# Patient Record
Sex: MALE | Race: BLACK OR AFRICAN AMERICAN | NOT HISPANIC OR LATINO | URBAN - METROPOLITAN AREA
[De-identification: names, ages, dates, MRNs, and addresses within clinical notes are randomized per-mention and may not be internally consistent; named-entity substitution may affect disease eponyms.]

---

## 2023-10-14 ENCOUNTER — HOSPITAL ENCOUNTER (EMERGENCY)
Facility: HOSPITAL | Age: 33
Discharge: HOME | End: 2023-10-14
Payer: COMMERCIAL

## 2023-10-14 VITALS
BODY MASS INDEX: 50.62 KG/M2 | HEART RATE: 77 BPM | HEIGHT: 66 IN | WEIGHT: 315 LBS | RESPIRATION RATE: 16 BRPM | OXYGEN SATURATION: 97 % | TEMPERATURE: 98.4 F

## 2023-10-14 DIAGNOSIS — G43.009 MIGRAINE WITHOUT AURA AND WITHOUT STATUS MIGRAINOSUS, NOT INTRACTABLE: Primary | ICD-10-CM

## 2023-10-14 PROCEDURE — 99283 EMERGENCY DEPT VISIT LOW MDM: CPT

## 2023-10-14 PROCEDURE — 99284 EMERGENCY DEPT VISIT MOD MDM: CPT

## 2023-10-14 PROCEDURE — 2500000004 HC RX 250 GENERAL PHARMACY W/ HCPCS (ALT 636 FOR OP/ED)

## 2023-10-14 RX ORDER — KETOROLAC TROMETHAMINE 30 MG/ML
30 INJECTION, SOLUTION INTRAMUSCULAR; INTRAVENOUS ONCE
Status: COMPLETED | OUTPATIENT
Start: 2023-10-14 | End: 2023-10-14

## 2023-10-14 RX ORDER — METOCLOPRAMIDE HYDROCHLORIDE 5 MG/ML
10 INJECTION INTRAMUSCULAR; INTRAVENOUS ONCE
Status: COMPLETED | OUTPATIENT
Start: 2023-10-14 | End: 2023-10-14

## 2023-10-14 RX ORDER — DIPHENHYDRAMINE HYDROCHLORIDE 50 MG/ML
25 INJECTION INTRAMUSCULAR; INTRAVENOUS ONCE
Status: COMPLETED | OUTPATIENT
Start: 2023-10-14 | End: 2023-10-14

## 2023-10-14 RX ADMIN — DIPHENHYDRAMINE HYDROCHLORIDE 25 MG: 50 INJECTION INTRAMUSCULAR; INTRAVENOUS at 07:43

## 2023-10-14 RX ADMIN — METOCLOPRAMIDE 10 MG: 5 INJECTION, SOLUTION INTRAMUSCULAR; INTRAVENOUS at 07:43

## 2023-10-14 RX ADMIN — KETOROLAC TROMETHAMINE 30 MG: 30 INJECTION, SOLUTION INTRAMUSCULAR; INTRAVENOUS at 07:43

## 2023-10-14 ASSESSMENT — LIFESTYLE VARIABLES
HAVE PEOPLE ANNOYED YOU BY CRITICIZING YOUR DRINKING: NO
REASON UNABLE TO ASSESS: NO
HAVE YOU EVER FELT YOU SHOULD CUT DOWN ON YOUR DRINKING: NO
EVER HAD A DRINK FIRST THING IN THE MORNING TO STEADY YOUR NERVES TO GET RID OF A HANGOVER: NO
EVER FELT BAD OR GUILTY ABOUT YOUR DRINKING: NO

## 2023-10-14 ASSESSMENT — PAIN - FUNCTIONAL ASSESSMENT: PAIN_FUNCTIONAL_ASSESSMENT: 0-10

## 2023-10-14 ASSESSMENT — COLUMBIA-SUICIDE SEVERITY RATING SCALE - C-SSRS
6. HAVE YOU EVER DONE ANYTHING, STARTED TO DO ANYTHING, OR PREPARED TO DO ANYTHING TO END YOUR LIFE?: NO
2. HAVE YOU ACTUALLY HAD ANY THOUGHTS OF KILLING YOURSELF?: NO
1. IN THE PAST MONTH, HAVE YOU WISHED YOU WERE DEAD OR WISHED YOU COULD GO TO SLEEP AND NOT WAKE UP?: NO

## 2023-10-14 ASSESSMENT — PAIN DESCRIPTION - PAIN TYPE: TYPE: ACUTE PAIN

## 2023-10-14 ASSESSMENT — PAIN DESCRIPTION - PROGRESSION: CLINICAL_PROGRESSION: NOT CHANGED

## 2023-10-14 ASSESSMENT — PAIN SCALES - GENERAL: PAINLEVEL_OUTOF10: 10 - WORST POSSIBLE PAIN

## 2023-10-14 ASSESSMENT — PAIN DESCRIPTION - LOCATION: LOCATION: NECK

## 2023-10-14 NOTE — Clinical Note
Luis Mcgill was seen and treated in our emergency department on 10/14/2023.  He may return to work on 10/15/2023.       If you have any questions or concerns, please don't hesitate to call.      Rocío Bautista PA-C

## 2023-10-14 NOTE — ED TRIAGE NOTES
Pt reports to ED for Migraine upon wakening, Stiff Neck, Bilateral blurry vision associated to migraine. Pt states medications haven't been helpful towards pain. PMHx of Migraines but pt states this is a new pain. Pt states pain is at occipital region of head. Axox4. Pt gait steady.

## 2023-10-14 NOTE — ED PROVIDER NOTES
"HPI   Chief Complaint   Patient presents with    Neck Pain    Eye Problem    Headache       Patient is a 33-year-old male with past medical history of migraines that presents today for concerns of a migraine.  Notes that for the past few days, he has noted bilateral occipital migraines with vision blurriness.  Notes that this is typical to his migraines.  Some nausea, no vomiting.  Notes that he goes to bed without the migraines and wakes up with severe neck pain which is new to him.  Notes that after few hours of being awake, the neck pain alleviates though it comes back the next morning when he wakes up.  Notes that he has been having a lot of stress in his life.  Notes that he has a \"sense of doom\" and has intense new panic attacks with associated shortness of breath and throat tightness.  Attempting to take a new SSRI per his doctor for the past 3 weeks, though he does not take it daily.  no SI/HI.                          Og Coma Scale Score: 15                  Patient History   Past Medical History:   Diagnosis Date    Migraine     Priapism, unspecified 03/13/2015    Priapism     Past Surgical History:   Procedure Laterality Date    OTHER SURGICAL HISTORY  02/11/2020    No history of surgery     No family history on file.  Social History     Tobacco Use    Smoking status: Never    Smokeless tobacco: Never   Vaping Use    Vaping Use: Never used   Substance Use Topics    Alcohol use: Never    Drug use: Never       Physical Exam   ED Triage Vitals [10/14/23 0437]   Temp Heart Rate Resp BP   36.9 °C (98.4 °F) 77 16 --      SpO2 Temp Source Heart Rate Source Patient Position   97 % Temporal Monitor --      BP Location FiO2 (%)     -- --       Physical Exam  Vitals and nursing note reviewed.   Constitutional:       General: He is not in acute distress.     Appearance: Normal appearance. He is not ill-appearing.   HENT:      Head: Normocephalic and atraumatic.      Right Ear: External ear normal.      Left Ear: " External ear normal.      Nose: Nose normal. No congestion or rhinorrhea.      Mouth/Throat:      Pharynx: Oropharynx is clear. No oropharyngeal exudate or posterior oropharyngeal erythema.   Eyes:      Extraocular Movements: Extraocular movements intact.      Conjunctiva/sclera: Conjunctivae normal.      Pupils: Pupils are equal, round, and reactive to light.   Cardiovascular:      Rate and Rhythm: Normal rate and regular rhythm.      Heart sounds: No murmur heard.     No friction rub. No gallop.   Pulmonary:      Effort: Pulmonary effort is normal. No accessory muscle usage or respiratory distress.      Breath sounds: Normal breath sounds. No stridor. No wheezing, rhonchi or rales.   Abdominal:      General: Abdomen is flat. Bowel sounds are normal. There is no distension.      Palpations: Abdomen is soft. There is no mass.      Tenderness: There is no abdominal tenderness. There is no right CVA tenderness, left CVA tenderness, guarding or rebound.   Musculoskeletal:         General: No swelling or deformity. Normal range of motion.      Cervical back: Normal range of motion and neck supple. No rigidity. No pain with movement. Normal range of motion.      Right lower leg: No edema.      Left lower leg: No edema.   Lymphadenopathy:      Cervical: No cervical adenopathy.   Skin:     General: Skin is warm and dry.      Capillary Refill: Capillary refill takes less than 2 seconds.      Findings: No laceration, lesion or rash.   Neurological:      General: No focal deficit present.      Mental Status: He is alert and oriented to person, place, and time.      Cranial Nerves: Cranial nerves 2-12 are intact.      Sensory: No sensory deficit.      Motor: Motor function is intact. No weakness or pronator drift.      Coordination: Romberg sign negative. Finger-Nose-Finger Test and Heel to Shin Test normal.      Gait: Gait normal.      Deep Tendon Reflexes: Reflexes normal.   Psychiatric:         Mood and Affect: Mood and  affect normal.         Speech: Speech normal.         Behavior: Behavior normal. Behavior is cooperative.         Thought Content: Thought content normal.         Judgment: Judgment normal.         ED Course & MDM   Diagnoses as of 10/14/23 0835   Migraine without aura and without status migrainosus, not intractable       Medical Decision Making  Patient is a 33-year-old male who presents today for migraine.  Notes that he has had a headache for the past few days that is typical with his migraines.  Also does note that he has been having neck pain/tension when he wakes up in the morning for the past few days.  He notes that the neck pain is not consistent with his migraines.  He does note that he has been having a lot of stressors in his life and waking up with panic attacks.  Has been seeing his physician for this panic attack/anxiety.  Started taking Effexor, though not consistently.  No SI/HI.  Denies any fever or chills, no neck rigidity on exam, no pain with neck movement.  I have very low suspicion for meningitis.    Neuro exam shows no cranial nerve deficits.  Upper and lower extremities 5 out of 5 and equal strength.  No noted focal deficits or cerebellar dysfunction.  Does note some decreased vision with his migraines, though this is typical for him.  No concern for CVA at this time.  I believe that his neck pain is from tension headache due to increased stressors in his life.  We will give him a migraine cocktail including Benadryl, Reglan and Toradol.  I did offer to give him IV fluids for rehydration in case he is dehydrated.  He declined at this time stating he does not want an IV.  Encouraged oral hydration.  Upon reevaluation, patient notes that he feels some decrease in his headache, though it is present.  Of note, his only been approximately 10 to 15 minutes after receiving IM medications and I do not believe that they have hit full effect.  He notes that he does not want to stay and requesting to  leave since he has been here for approximately 3 to 4 hours.  Gave him strict return precautions, told him that he needs to consistently take his SSRIs, and told him to follow-up with his neurologist if the headaches are persistent.        Procedure  Procedures     Rocío Bautista PA-C  10/14/23 0885

## 2023-10-14 NOTE — ED NOTES
Discharge paperwork gone over with pt. Education provided and prescriptions (if applicable) were given to pt. Pt IV removed and there is no bleeding at the site of removal. Pt was ambulatory out of ED independently. Pt has no further questions or concerns at this time. Treatment complete.        Sanju Abraham RN  10/14/23 9208

## 2023-12-01 PROBLEM — E66.9 OBESITY: Status: ACTIVE | Noted: 2023-12-01

## 2023-12-01 PROBLEM — J06.9 VIRAL URI WITH COUGH: Status: ACTIVE | Noted: 2023-12-01

## 2023-12-01 PROBLEM — G89.29 CHRONIC PAIN OF BOTH KNEES: Status: ACTIVE | Noted: 2021-07-27

## 2023-12-01 PROBLEM — F41.0 SEVERE ANXIETY WITH PANIC: Status: ACTIVE | Noted: 2023-02-01

## 2023-12-01 PROBLEM — R00.2 PALPITATIONS: Status: ACTIVE | Noted: 2023-12-01

## 2023-12-01 PROBLEM — R05.9 COUGH: Status: ACTIVE | Noted: 2017-01-27

## 2023-12-01 PROBLEM — K21.9 GERD WITHOUT ESOPHAGITIS: Status: ACTIVE | Noted: 2021-12-22

## 2023-12-01 PROBLEM — K76.0 FATTY LIVER: Status: ACTIVE | Noted: 2021-07-27

## 2023-12-01 PROBLEM — K29.70 GASTRITIS: Status: ACTIVE | Noted: 2021-01-11

## 2023-12-01 PROBLEM — K22.89 ESOPHAGOGASTRIC JUNCTION OUTFLOW OBSTRUCTION: Status: ACTIVE | Noted: 2023-12-01

## 2023-12-01 PROBLEM — K22.4 ESOPHAGEAL SPASM: Status: ACTIVE | Noted: 2023-12-01

## 2023-12-01 PROBLEM — H01.00B BLEPHARITIS OF BOTH UPPER AND LOWER EYELID OF LEFT EYE: Status: ACTIVE | Noted: 2023-12-01

## 2023-12-01 PROBLEM — H10.33 ACUTE BACTERIAL CONJUNCTIVITIS OF BOTH EYES: Status: ACTIVE | Noted: 2023-12-01

## 2023-12-01 PROBLEM — E66.813 OBESITY, CLASS III, BMI 40-49.9 (MORBID OBESITY): Status: ACTIVE | Noted: 2021-12-08

## 2023-12-01 PROBLEM — H35.413 BILATERAL RETINAL LATTICE DEGENERATION: Status: ACTIVE | Noted: 2021-12-08

## 2023-12-01 PROBLEM — G89.29 CHRONIC MIDLINE LOW BACK PAIN WITHOUT SCIATICA: Status: ACTIVE | Noted: 2021-10-29

## 2023-12-01 PROBLEM — M54.50 CHRONIC MIDLINE LOW BACK PAIN WITHOUT SCIATICA: Status: ACTIVE | Noted: 2021-10-29

## 2023-12-01 PROBLEM — E55.9 VITAMIN D DEFICIENCY: Status: ACTIVE | Noted: 2021-03-22

## 2023-12-01 PROBLEM — M25.562 CHRONIC PAIN OF BOTH KNEES: Status: ACTIVE | Noted: 2021-07-27

## 2023-12-01 PROBLEM — R07.89 CHEST PAIN, ATYPICAL: Status: ACTIVE | Noted: 2023-12-01

## 2023-12-01 PROBLEM — R73.03 PREDIABETES: Status: ACTIVE | Noted: 2020-12-15

## 2023-12-01 PROBLEM — G43.909 MIGRAINE: Status: ACTIVE | Noted: 2023-12-01

## 2023-12-01 PROBLEM — M25.552 LEFT HIP PAIN: Status: ACTIVE | Noted: 2021-07-27

## 2023-12-01 PROBLEM — N48.89 PENILE PAIN: Status: ACTIVE | Noted: 2023-12-01

## 2023-12-01 PROBLEM — M19.90 ARTHRITIS: Status: ACTIVE | Noted: 2022-04-21

## 2023-12-01 PROBLEM — Z98.84 BARIATRIC SURGERY STATUS: Status: ACTIVE | Noted: 2023-12-01

## 2023-12-01 PROBLEM — K22.2 ESOPHAGEAL OBSTRUCTION: Status: ACTIVE | Noted: 2021-01-11

## 2023-12-01 PROBLEM — M79.7 FIBROMYALGIA: Status: ACTIVE | Noted: 2021-10-29

## 2023-12-01 PROBLEM — N48.30 PRIAPISM: Status: ACTIVE | Noted: 2020-04-24

## 2023-12-01 PROBLEM — E78.5 BORDERLINE HYPERLIPIDEMIA: Status: ACTIVE | Noted: 2020-12-15

## 2023-12-01 PROBLEM — B36.9 FUNGAL INFECTION OF SKIN: Status: ACTIVE | Noted: 2023-12-01

## 2023-12-01 PROBLEM — M16.0 PRIMARY OSTEOARTHRITIS OF BOTH HIPS: Status: ACTIVE | Noted: 2021-07-27

## 2023-12-01 PROBLEM — G47.33 OBSTRUCTIVE SLEEP APNEA SYNDROME: Status: ACTIVE | Noted: 2021-12-08

## 2023-12-01 PROBLEM — M25.561 CHRONIC PAIN OF BOTH KNEES: Status: ACTIVE | Noted: 2021-07-27

## 2023-12-01 PROBLEM — K22.2 ESOPHAGOGASTRIC JUNCTION OUTFLOW OBSTRUCTION: Status: ACTIVE | Noted: 2023-12-01

## 2023-12-01 PROBLEM — H01.00A BLEPHARITIS OF BOTH UPPER AND LOWER EYELID OF RIGHT EYE: Status: ACTIVE | Noted: 2023-12-01

## 2023-12-01 PROBLEM — F54 PSYCHOLOGICAL FACTOR AFFECTING PHYSICAL CONDITION: Status: ACTIVE | Noted: 2023-12-01

## 2023-12-01 PROBLEM — E66.01 OBESITY, CLASS III, BMI 40-49.9 (MORBID OBESITY) (MULTI): Status: ACTIVE | Noted: 2021-12-08

## 2023-12-01 PROBLEM — F32.9 CURRENT EPISODE OF MAJOR DEPRESSIVE DISORDER WITHOUT PRIOR EPISODE: Status: ACTIVE | Noted: 2021-12-22

## 2023-12-01 PROBLEM — F41.9 ANXIETY AND DEPRESSION: Status: ACTIVE | Noted: 2021-07-27

## 2023-12-01 PROBLEM — R07.81 PLEURITIC PAIN: Status: ACTIVE | Noted: 2017-01-27

## 2023-12-01 PROBLEM — F32.A ANXIETY AND DEPRESSION: Status: ACTIVE | Noted: 2021-07-27

## 2023-12-01 PROBLEM — L73.9 FOLLICULITIS: Status: ACTIVE | Noted: 2020-04-24

## 2023-12-01 PROBLEM — K31.89 ESOPHAGOGASTRIC JUNCTION OUTFLOW OBSTRUCTION: Status: ACTIVE | Noted: 2023-12-01

## 2023-12-01 RX ORDER — OMEPRAZOLE 20 MG/1
20 CAPSULE, DELAYED RELEASE ORAL 2 TIMES DAILY
COMMUNITY
Start: 2022-12-08 | End: 2023-12-05 | Stop reason: WASHOUT

## 2023-12-01 RX ORDER — PEDI MULTIVIT NO.25/FOLIC ACID 300 MCG
18 TABLET,CHEWABLE ORAL 2 TIMES DAILY
COMMUNITY
Start: 2020-07-30 | End: 2023-12-05 | Stop reason: WASHOUT

## 2023-12-01 RX ORDER — CYCLOBENZAPRINE HCL 10 MG
10 TABLET ORAL 3 TIMES DAILY
COMMUNITY
Start: 2022-05-27 | End: 2023-12-05 | Stop reason: WASHOUT

## 2023-12-01 RX ORDER — NITROGLYCERIN 0.4 MG/1
0.4 TABLET SUBLINGUAL
COMMUNITY
Start: 2022-09-25 | End: 2023-12-05 | Stop reason: WASHOUT

## 2023-12-01 RX ORDER — MULTIVIT-MIN/VIT C/HERB NO.124 250-8.875
TABLET,CHEWABLE ORAL
COMMUNITY
Start: 2020-07-30 | End: 2023-12-05 | Stop reason: WASHOUT

## 2023-12-01 RX ORDER — SODIUM CHLORIDE 0.9 % (FLUSH) 0.9 %
10 SYRINGE (ML) INJECTION
COMMUNITY
Start: 2022-09-25 | End: 2023-12-05 | Stop reason: WASHOUT

## 2023-12-01 RX ORDER — GABAPENTIN 600 MG/1
600 TABLET ORAL 3 TIMES DAILY
COMMUNITY
Start: 2022-03-14 | End: 2023-12-05 | Stop reason: WASHOUT

## 2023-12-01 RX ORDER — VENLAFAXINE HYDROCHLORIDE 37.5 MG/1
37.5 CAPSULE, EXTENDED RELEASE ORAL
COMMUNITY
Start: 2022-11-21 | End: 2023-12-05 | Stop reason: WASHOUT

## 2023-12-01 RX ORDER — HYDROXYZINE HYDROCHLORIDE 25 MG/1
25 TABLET, FILM COATED ORAL EVERY 6 HOURS PRN
COMMUNITY
Start: 2023-07-24 | End: 2024-04-18 | Stop reason: SDUPTHER

## 2023-12-01 RX ORDER — DOXYCYCLINE HYCLATE 100 MG
100 TABLET ORAL 2 TIMES DAILY
COMMUNITY
Start: 2021-04-16 | End: 2023-12-05 | Stop reason: WASHOUT

## 2023-12-01 RX ORDER — SUCRALFATE 1 G/1
1 TABLET ORAL 4 TIMES DAILY
COMMUNITY
Start: 2023-07-24 | End: 2023-12-05 | Stop reason: WASHOUT

## 2023-12-01 RX ORDER — VENLAFAXINE HYDROCHLORIDE 150 MG/1
150 CAPSULE, EXTENDED RELEASE ORAL
COMMUNITY
Start: 2023-09-27 | End: 2023-12-05 | Stop reason: WASHOUT

## 2023-12-01 RX ORDER — DULAGLUTIDE 0.75 MG/.5ML
0.75 INJECTION, SOLUTION SUBCUTANEOUS
COMMUNITY
Start: 2022-06-29 | End: 2023-12-05 | Stop reason: WASHOUT

## 2023-12-01 RX ORDER — SERTRALINE HYDROCHLORIDE 100 MG/1
50 TABLET, FILM COATED ORAL
COMMUNITY
Start: 2022-10-26 | End: 2023-12-05 | Stop reason: WASHOUT

## 2023-12-01 RX ORDER — ACETAMINOPHEN 500 MG
2000 TABLET ORAL DAILY
COMMUNITY
Start: 2021-03-22 | End: 2023-12-05 | Stop reason: WASHOUT

## 2023-12-01 RX ORDER — TADALAFIL 5 MG/1
1 TABLET ORAL DAILY
COMMUNITY
Start: 2020-12-21 | End: 2023-12-05 | Stop reason: WASHOUT

## 2023-12-01 RX ORDER — IBUPROFEN 600 MG/1
600 TABLET ORAL 4 TIMES DAILY PRN
COMMUNITY
Start: 2022-05-23 | End: 2023-12-05 | Stop reason: WASHOUT

## 2023-12-01 RX ORDER — PREGABALIN 50 MG/1
50 CAPSULE ORAL
COMMUNITY
Start: 2021-07-26 | End: 2023-12-05 | Stop reason: WASHOUT

## 2023-12-01 RX ORDER — MOXIFLOXACIN 5 MG/ML
1 SOLUTION/ DROPS OPHTHALMIC 4 TIMES DAILY
COMMUNITY
Start: 2020-09-14 | End: 2023-12-05 | Stop reason: WASHOUT

## 2023-12-01 RX ORDER — ERGOCALCIFEROL 1.25 MG/1
50000 CAPSULE ORAL
COMMUNITY
Start: 2022-07-27 | End: 2023-12-05 | Stop reason: WASHOUT

## 2023-12-05 ENCOUNTER — OFFICE VISIT (OUTPATIENT)
Dept: PRIMARY CARE | Facility: HOSPITAL | Age: 33
End: 2023-12-05
Payer: COMMERCIAL

## 2023-12-05 VITALS
OXYGEN SATURATION: 98 % | WEIGHT: 315 LBS | TEMPERATURE: 97.3 F | HEART RATE: 96 BPM | BODY MASS INDEX: 50.62 KG/M2 | SYSTOLIC BLOOD PRESSURE: 121 MMHG | HEIGHT: 66 IN | DIASTOLIC BLOOD PRESSURE: 73 MMHG

## 2023-12-05 DIAGNOSIS — E66.8 EXTREME OBESITY: ICD-10-CM

## 2023-12-05 DIAGNOSIS — K21.00 GASTROESOPHAGEAL REFLUX DISEASE WITH ESOPHAGITIS WITHOUT HEMORRHAGE: ICD-10-CM

## 2023-12-05 DIAGNOSIS — F32.A DEPRESSION, UNSPECIFIED DEPRESSION TYPE: ICD-10-CM

## 2023-12-05 DIAGNOSIS — E88.810 METABOLIC SYNDROME: ICD-10-CM

## 2023-12-05 DIAGNOSIS — R07.89 OTHER CHEST PAIN: Primary | ICD-10-CM

## 2023-12-05 DIAGNOSIS — G43.809 OTHER MIGRAINE WITHOUT STATUS MIGRAINOSUS, NOT INTRACTABLE: ICD-10-CM

## 2023-12-05 DIAGNOSIS — Z11.3 ROUTINE SCREENING FOR STI (SEXUALLY TRANSMITTED INFECTION): ICD-10-CM

## 2023-12-05 DIAGNOSIS — J02.9 SORE THROAT: ICD-10-CM

## 2023-12-05 LAB
EST. AVERAGE GLUCOSE BLD GHB EST-MCNC: 134 MG/DL
GLUCOSE BLD MANUAL STRIP-MCNC: 121 MG/DL (ref 74–99)
HBA1C MFR BLD: 6.3 %
HCV AB SER QL: NONREACTIVE
HIV 1+2 AB+HIV1 P24 AG SERPL QL IA: NONREACTIVE
POC RAPID STREP: NEGATIVE
T PALLIDUM AB SER QL: NONREACTIVE

## 2023-12-05 PROCEDURE — 1036F TOBACCO NON-USER: CPT | Performed by: STUDENT IN AN ORGANIZED HEALTH CARE EDUCATION/TRAINING PROGRAM

## 2023-12-05 PROCEDURE — 99205 OFFICE O/P NEW HI 60 MIN: CPT | Performed by: STUDENT IN AN ORGANIZED HEALTH CARE EDUCATION/TRAINING PROGRAM

## 2023-12-05 PROCEDURE — 87661 TRICHOMONAS VAGINALIS AMPLIF: CPT | Performed by: STUDENT IN AN ORGANIZED HEALTH CARE EDUCATION/TRAINING PROGRAM

## 2023-12-05 PROCEDURE — 36415 COLL VENOUS BLD VENIPUNCTURE: CPT | Performed by: STUDENT IN AN ORGANIZED HEALTH CARE EDUCATION/TRAINING PROGRAM

## 2023-12-05 PROCEDURE — 87800 DETECT AGNT MULT DNA DIREC: CPT | Performed by: STUDENT IN AN ORGANIZED HEALTH CARE EDUCATION/TRAINING PROGRAM

## 2023-12-05 PROCEDURE — 86803 HEPATITIS C AB TEST: CPT | Performed by: STUDENT IN AN ORGANIZED HEALTH CARE EDUCATION/TRAINING PROGRAM

## 2023-12-05 PROCEDURE — 86780 TREPONEMA PALLIDUM: CPT | Performed by: STUDENT IN AN ORGANIZED HEALTH CARE EDUCATION/TRAINING PROGRAM

## 2023-12-05 PROCEDURE — 90471 IMMUNIZATION ADMIN: CPT | Mod: GC | Performed by: STUDENT IN AN ORGANIZED HEALTH CARE EDUCATION/TRAINING PROGRAM

## 2023-12-05 PROCEDURE — 3008F BODY MASS INDEX DOCD: CPT | Performed by: STUDENT IN AN ORGANIZED HEALTH CARE EDUCATION/TRAINING PROGRAM

## 2023-12-05 PROCEDURE — 93005 ELECTROCARDIOGRAM TRACING: CPT | Performed by: STUDENT IN AN ORGANIZED HEALTH CARE EDUCATION/TRAINING PROGRAM

## 2023-12-05 PROCEDURE — 83036 HEMOGLOBIN GLYCOSYLATED A1C: CPT | Performed by: STUDENT IN AN ORGANIZED HEALTH CARE EDUCATION/TRAINING PROGRAM

## 2023-12-05 PROCEDURE — 99215 OFFICE O/P EST HI 40 MIN: CPT | Mod: 25,GC | Performed by: STUDENT IN AN ORGANIZED HEALTH CARE EDUCATION/TRAINING PROGRAM

## 2023-12-05 PROCEDURE — 87389 HIV-1 AG W/HIV-1&-2 AB AG IA: CPT | Performed by: STUDENT IN AN ORGANIZED HEALTH CARE EDUCATION/TRAINING PROGRAM

## 2023-12-05 PROCEDURE — 82947 ASSAY GLUCOSE BLOOD QUANT: CPT | Performed by: STUDENT IN AN ORGANIZED HEALTH CARE EDUCATION/TRAINING PROGRAM

## 2023-12-05 PROCEDURE — 87880 STREP A ASSAY W/OPTIC: CPT | Mod: GC | Performed by: STUDENT IN AN ORGANIZED HEALTH CARE EDUCATION/TRAINING PROGRAM

## 2023-12-05 PROCEDURE — 36416 COLLJ CAPILLARY BLOOD SPEC: CPT | Mod: GC | Performed by: STUDENT IN AN ORGANIZED HEALTH CARE EDUCATION/TRAINING PROGRAM

## 2023-12-05 PROCEDURE — 93010 ELECTROCARDIOGRAM REPORT: CPT | Performed by: INTERNAL MEDICINE

## 2023-12-05 RX ORDER — SUMATRIPTAN 50 MG/1
50 TABLET, FILM COATED ORAL ONCE AS NEEDED
Qty: 27 TABLET | Refills: 3 | Status: SHIPPED | OUTPATIENT
Start: 2023-12-05 | End: 2024-04-18 | Stop reason: SDUPTHER

## 2023-12-05 RX ORDER — FAMOTIDINE 10 MG/1
10 TABLET ORAL 2 TIMES DAILY
Qty: 60 TABLET | Refills: 5 | Status: SHIPPED | OUTPATIENT
Start: 2023-12-05 | End: 2024-04-18 | Stop reason: WASHOUT

## 2023-12-05 RX ORDER — VENLAFAXINE HYDROCHLORIDE 75 MG/1
75 CAPSULE, EXTENDED RELEASE ORAL DAILY
Qty: 30 CAPSULE | Refills: 1 | Status: SHIPPED | OUTPATIENT
Start: 2023-12-05 | End: 2024-04-09

## 2023-12-05 ASSESSMENT — ENCOUNTER SYMPTOMS
LOSS OF SENSATION IN FEET: 0
DEPRESSION: 0
OCCASIONAL FEELINGS OF UNSTEADINESS: 0

## 2023-12-05 ASSESSMENT — PAIN SCALES - GENERAL: PAINLEVEL: 10-WORST PAIN EVER

## 2023-12-05 ASSESSMENT — COLUMBIA-SUICIDE SEVERITY RATING SCALE - C-SSRS
1. IN THE PAST MONTH, HAVE YOU WISHED YOU WERE DEAD OR WISHED YOU COULD GO TO SLEEP AND NOT WAKE UP?: NO
2. HAVE YOU ACTUALLY HAD ANY THOUGHTS OF KILLING YOURSELF?: NO
6. HAVE YOU EVER DONE ANYTHING, STARTED TO DO ANYTHING, OR PREPARED TO DO ANYTHING TO END YOUR LIFE?: NO
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
## 2023-12-05 NOTE — PATIENT INSTRUCTIONS
Eusebio Mr. Mcgill,    It was a pleasure meeting you! Today we talked about several important aspects of your health.     1) Weight loss will be extremely important for you, I am sending to see our bariatrics team to discuss weight loss options. I believe that this will be important for your overall health.     2) We have collected blood work today, I will call you with the results when they come back.     3) I have sent a referral for psychiatry and psychology to help us treat your depression. Please start taking venlafaxine 75 mg daily every day for the next month. I would like to see you back in clinic approximately 6 weeks to discuss how this is going.    4) For your GERD, I am prescribing famotidine.     5) For your migraines, please start imitrex which you can take when your symptoms come on. It is also ok to take acetaminophen and/or ibuprofen.     6) If you have worsening chest pain or shortness of breath, please go to the emergency room as this could be signs of a heart attack. The EKG we performed today was normal. If this worsens at all, please seek care at the ED.    7) We performed a rapid strep test here in the office. If it comes back positive, I will send antibiotics to your pharmacy. If not, please drink plenty of fluids at home and it should resolve on its own. If it is does not go away on its own in the next 10 days, please call our office.     Please return to clinic 6-8 weeks.    All the best,  Dr. López

## 2023-12-06 ENCOUNTER — DOCUMENTATION (OUTPATIENT)
Dept: SURGERY | Facility: HOSPITAL | Age: 33
End: 2023-12-06
Payer: COMMERCIAL

## 2023-12-06 LAB
C TRACH RRNA SPEC QL NAA+PROBE: NEGATIVE
N GONORRHOEA DNA SPEC QL PROBE+SIG AMP: NEGATIVE
T VAGINALIS RRNA SPEC QL NAA+PROBE: NEGATIVE

## 2023-12-06 NOTE — PROGRESS NOTES
Recvd Pt as referral. Scheduled. Pt chose [facility] at [facility], email sent of appt confirmation and np packet.

## 2023-12-07 ENCOUNTER — TELEPHONE (OUTPATIENT)
Dept: PRIMARY CARE | Facility: HOSPITAL | Age: 33
End: 2023-12-07
Payer: COMMERCIAL

## 2023-12-07 NOTE — TELEPHONE ENCOUNTER
Called patient to discuss all of his testing results. Informed him that all STI testing was negative and we discussed that while he does not have diabetes yet, his Hgb A1c is elevated and he has pre-diabetes. Discussed that we may want to start additional medications when he comes to clinic (metformin) and he inquired about restarting Trulicity which he took approximately 1 year ago. Informed him that he should discuss this with his bariatric provider and me when he comes to his next appointment in 2 months.

## 2023-12-08 ENCOUNTER — HOSPITAL ENCOUNTER (EMERGENCY)
Facility: HOSPITAL | Age: 33
Discharge: HOME | End: 2023-12-08
Attending: EMERGENCY MEDICINE
Payer: COMMERCIAL

## 2023-12-08 VITALS
BODY MASS INDEX: 50.62 KG/M2 | HEART RATE: 87 BPM | RESPIRATION RATE: 18 BRPM | WEIGHT: 315 LBS | HEIGHT: 66 IN | TEMPERATURE: 97.7 F | OXYGEN SATURATION: 96 % | SYSTOLIC BLOOD PRESSURE: 127 MMHG | DIASTOLIC BLOOD PRESSURE: 73 MMHG

## 2023-12-08 DIAGNOSIS — M54.2 NECK PAIN: Primary | ICD-10-CM

## 2023-12-08 PROCEDURE — 99284 EMERGENCY DEPT VISIT MOD MDM: CPT | Performed by: EMERGENCY MEDICINE

## 2023-12-08 PROCEDURE — 96372 THER/PROPH/DIAG INJ SC/IM: CPT

## 2023-12-08 PROCEDURE — 93010 ELECTROCARDIOGRAM REPORT: CPT

## 2023-12-08 PROCEDURE — 2500000004 HC RX 250 GENERAL PHARMACY W/ HCPCS (ALT 636 FOR OP/ED): Mod: SE

## 2023-12-08 PROCEDURE — 99284 EMERGENCY DEPT VISIT MOD MDM: CPT

## 2023-12-08 PROCEDURE — 99283 EMERGENCY DEPT VISIT LOW MDM: CPT | Mod: 25

## 2023-12-08 RX ORDER — KETOROLAC TROMETHAMINE 30 MG/ML
30 INJECTION, SOLUTION INTRAMUSCULAR; INTRAVENOUS ONCE
Status: COMPLETED | OUTPATIENT
Start: 2023-12-08 | End: 2023-12-08

## 2023-12-08 RX ORDER — ORPHENADRINE CITRATE 30 MG/ML
60 INJECTION INTRAMUSCULAR; INTRAVENOUS ONCE
Status: COMPLETED | OUTPATIENT
Start: 2023-12-08 | End: 2023-12-08

## 2023-12-08 RX ORDER — IBUPROFEN 600 MG/1
600 TABLET ORAL EVERY 8 HOURS PRN
Qty: 30 TABLET | Refills: 0 | Status: SHIPPED | OUTPATIENT
Start: 2023-12-08 | End: 2024-04-18 | Stop reason: SDUPTHER

## 2023-12-08 RX ORDER — ACETAMINOPHEN 325 MG/1
650 TABLET ORAL EVERY 6 HOURS PRN
Qty: 30 TABLET | Refills: 0 | Status: SHIPPED | OUTPATIENT
Start: 2023-12-08 | End: 2024-04-18 | Stop reason: SDUPTHER

## 2023-12-08 RX ADMIN — ORPHENADRINE CITRATE 60 MG: 60 INJECTION INTRAMUSCULAR; INTRAVENOUS at 16:06

## 2023-12-08 RX ADMIN — KETOROLAC TROMETHAMINE 30 MG: 30 INJECTION, SOLUTION INTRAMUSCULAR; INTRAVENOUS at 16:06

## 2023-12-08 NOTE — ED PROVIDER NOTES
HPI   Chief Complaint   Patient presents with    Sore Throat       Limitations to History: None    HPI: This is a 33-year-old male with a history of anxiety, obesity, GERD, depression, fibromyalgia, migraine headaches, at bedtime, and metabolic syndrome who presents to the emergency room with left-sided neck pain.  He states his symptoms began 2 days ago after experiencing an anxiety attack.  Patient denies any traumas or falls.  Patient states that his pain worsens when rotating his head to the right.  Patient denies any constitutional symptoms such as fevers, chills, cough, or nasal congestion.  Patient denies any difficulty swallowing or breathing.  Patient states that he has baseline chest pain and has no changes from his chronic chest pain.    Additional History Obtained from: None    12 point review of systems was performed and is negative unless otherwise specified    ------------------------------------------------------------------------------------------------------------------------------------------  Physical Exam:    VS: As documented in the triage note and EMR flowsheet from this visit were reviewed.    CONSTITUTIONAL: Well appearing, well nourished, awake, alert, oriented to person, place, time/situation and in no apparent distress.   EYES: Clear bilaterally, pupils equal, round and reactive to light.  ENT: Oropharynx is visualized to be nonerythematous.  There is no tonsillar exudates or swelling.  No tenderness to palpation over the trachea.  No trismus or uvula deviation.  Patient does have focal tenderness along the left sternocleidomastoid muscle.  CARDIOVASCULAR: Normal rate, regular rhythm.  Heart sounds S1, S2.  No murmurs, rubs or gallops.  RESPIRATORY: Breath sounds clear and equal bilaterally. No wheezes or rhonchi.   GASTROINTESTINAL: Abdomen soft, non-distended, no rebound, no guarding.   MUSCULOSKELETAL: Spine appears normal, range of motion is not limited, no muscle or joint tenderness.   Full extension, flexion, and rotation of the neck.  NEUROLOGICAL: Alert and oriented, no focal deficits, no motor or sensory deficits.   SKIN: Skin normal color for race, warm, dry and intact. No evidence of trauma.   PSYCHIATRIC: Alert and oriented to person, place, time/situation. normal mood and affect. No apparent risk to self or others.     ------------------------------------------------------------------------------------------------------------------------------------------    Medical Decision Making:    This is a 33-year-old male who presents to the emergency room with atraumatic left neck pain.  Patient main stable throughout course of care.  Given lack of trauma, I have lower concern for carotid dissection.  Patient has no signs of infection and I have low concern for strep or URI.  Patient likely has musculoskeletal pain.  This patient was staffed with my supervising attending who agreed with my evaluation.  Patient was administered Norflex and Toradol for symptomatic management.  Patient was reassessed and did have some improvement of symptoms.  Patient will be discharged home with prescriptions for ibuprofen and Tylenol for symptomatic management.  Patient was given strict return precautions.  Patient was agreeable to this plan and had no further questions.  I did provide patient with a number for the [facility] for outpatient follow-up.  Patient will be discharged home.  Patient understands that if symptoms worsen or change in any way, they should return for immediate medical attention.  Patient understands that they should follow-up with their primary care physician within the next week to follow-up for neck pain.  Patient was stable upon discharge.  EKG was unremarkable with normal sinus rhythm at 85 bpm.  P waves are associated with QRS complexes.  The axis is upright.  There are no ST segment or T wave abnormalities.  No significant change from previous EKG on 6/6/2023.      External  Records Reviewed: I reviewed recent and relevant outside records including: Previous provider notes      Escalation of Care:  Appropriate for outpatient follow-up with primary care provider    Social Determinants Affecting Care:  None    Prescription Drug Consideration: Ibuprofen, Tylenol      Discussion of Management with Other Providers:   I discussed the patient/results with: Supervising attending      NITHIN Washington PA-C  [facility]                            No data recorded                Patient History   Past Medical History:   Diagnosis Date    Migraine     Priapism, unspecified 03/13/2015    Priapism     Past Surgical History:   Procedure Laterality Date    OTHER SURGICAL HISTORY  02/11/2020    No history of surgery     No family history on file.  Social History     Tobacco Use    Smoking status: Never    Smokeless tobacco: Never   Vaping Use    Vaping Use: Never used   Substance Use Topics    Alcohol use: Never    Drug use: Never       Physical Exam   ED Triage Vitals [12/08/23 1447]   Temp Heart Rate Resp BP   36.5 °C (97.7 °F) 87 18 127/73      SpO2 Temp src Heart Rate Source Patient Position   96 % -- -- --      BP Location FiO2 (%)     -- --       Physical Exam    ED Course & MDM        Medical Decision Making      Procedure  Procedures     Simeon Burdick PA-C  12/08/23 1606       Simeon Burdick PA-C  12/08/23 1606

## 2023-12-08 NOTE — ED TRIAGE NOTES
2 day hx of neck pain starting in the anterior portion and left side. Pt states he has tried cough drops, tea without relief. Pain increased when moving. Endorses sore throat. States he had an anxiety attack several days ago and ha CP since.

## 2023-12-10 ENCOUNTER — ANCILLARY PROCEDURE (OUTPATIENT)
Dept: EMERGENCY MEDICINE | Facility: HOSPITAL | Age: 33
End: 2023-12-10
Payer: COMMERCIAL

## 2023-12-10 LAB
ATRIAL RATE: 85 BPM
P AXIS: 70 DEGREES
P OFFSET: 193 MS
P ONSET: 130 MS
PR INTERVAL: 192 MS
Q ONSET: 226 MS
QRS COUNT: 14 BEATS
QRS DURATION: 84 MS
QT INTERVAL: 376 MS
QTC CALCULATION(BAZETT): 447 MS
QTC FREDERICIA: 422 MS
R AXIS: 71 DEGREES
T AXIS: 68 DEGREES
T OFFSET: 414 MS
VENTRICULAR RATE: 85 BPM

## 2023-12-10 PROCEDURE — 93005 ELECTROCARDIOGRAM TRACING: CPT

## 2023-12-12 NOTE — PROGRESS NOTES
I saw and evaluated the patient. I personally obtained the key and critical portions of the history and physical exam or was physically present for key and critical portions performed by the resident/fellow. I reviewed the resident/fellow's documentation and discussed the patient with the resident/fellow. I agree with the resident/fellow's medical decision making as documented in the note.    Case d/w Dr. López; agree with his A/P. I have personally reviewed the plan with the pt as well. Lengthy discussion with pt re importance of weight loss and treating depression. Strep throat negative today.   Would check basic blood work, including A1C.  Would screen pt for NICOLE.    Hyun Guaman MD

## 2023-12-14 ENCOUNTER — ANCILLARY PROCEDURE (OUTPATIENT)
Dept: EMERGENCY MEDICINE | Facility: HOSPITAL | Age: 33
End: 2023-12-14
Payer: COMMERCIAL

## 2023-12-14 ENCOUNTER — APPOINTMENT (OUTPATIENT)
Dept: RADIOLOGY | Facility: HOSPITAL | Age: 33
End: 2023-12-14
Payer: COMMERCIAL

## 2023-12-14 ENCOUNTER — HOSPITAL ENCOUNTER (EMERGENCY)
Facility: HOSPITAL | Age: 33
Discharge: HOME | End: 2023-12-14
Attending: EMERGENCY MEDICINE
Payer: COMMERCIAL

## 2023-12-14 VITALS
OXYGEN SATURATION: 100 % | SYSTOLIC BLOOD PRESSURE: 116 MMHG | WEIGHT: 315 LBS | TEMPERATURE: 97.8 F | BODY MASS INDEX: 50.62 KG/M2 | HEART RATE: 71 BPM | RESPIRATION RATE: 18 BRPM | HEIGHT: 66 IN | DIASTOLIC BLOOD PRESSURE: 64 MMHG

## 2023-12-14 DIAGNOSIS — M54.41 ACUTE MIDLINE LOW BACK PAIN WITH RIGHT-SIDED SCIATICA: Primary | ICD-10-CM

## 2023-12-14 LAB
ATRIAL RATE: 66 BPM
P AXIS: 30 DEGREES
P OFFSET: 185 MS
P ONSET: 131 MS
PR INTERVAL: 194 MS
Q ONSET: 228 MS
QRS COUNT: 11 BEATS
QRS DURATION: 78 MS
QT INTERVAL: 408 MS
QTC CALCULATION(BAZETT): 427 MS
QTC FREDERICIA: 421 MS
R AXIS: 53 DEGREES
T AXIS: 66 DEGREES
T OFFSET: 432 MS
VENTRICULAR RATE: 66 BPM

## 2023-12-14 PROCEDURE — 93010 ELECTROCARDIOGRAM REPORT: CPT | Performed by: EMERGENCY MEDICINE

## 2023-12-14 PROCEDURE — 2500000005 HC RX 250 GENERAL PHARMACY W/O HCPCS: Mod: SE

## 2023-12-14 PROCEDURE — 93005 ELECTROCARDIOGRAM TRACING: CPT

## 2023-12-14 PROCEDURE — 99284 EMERGENCY DEPT VISIT MOD MDM: CPT | Performed by: EMERGENCY MEDICINE

## 2023-12-14 PROCEDURE — 72131 CT LUMBAR SPINE W/O DYE: CPT | Mod: FOREIGN READ | Performed by: RADIOLOGY

## 2023-12-14 PROCEDURE — 72131 CT LUMBAR SPINE W/O DYE: CPT

## 2023-12-14 PROCEDURE — 2500000004 HC RX 250 GENERAL PHARMACY W/ HCPCS (ALT 636 FOR OP/ED): Mod: SE

## 2023-12-14 PROCEDURE — 96372 THER/PROPH/DIAG INJ SC/IM: CPT

## 2023-12-14 RX ORDER — LIDOCAINE 560 MG/1
1 PATCH PERCUTANEOUS; TOPICAL; TRANSDERMAL ONCE
Status: DISCONTINUED | OUTPATIENT
Start: 2023-12-14 | End: 2023-12-14 | Stop reason: HOSPADM

## 2023-12-14 RX ORDER — ORPHENADRINE CITRATE 30 MG/ML
60 INJECTION INTRAMUSCULAR; INTRAVENOUS ONCE
Status: COMPLETED | OUTPATIENT
Start: 2023-12-14 | End: 2023-12-14

## 2023-12-14 RX ORDER — KETOROLAC TROMETHAMINE 30 MG/ML
60 INJECTION, SOLUTION INTRAMUSCULAR; INTRAVENOUS ONCE
Status: COMPLETED | OUTPATIENT
Start: 2023-12-14 | End: 2023-12-14

## 2023-12-14 RX ORDER — DEXAMETHASONE SODIUM PHOSPHATE 100 MG/10ML
10 INJECTION INTRAMUSCULAR; INTRAVENOUS ONCE
Status: DISCONTINUED | OUTPATIENT
Start: 2023-12-14 | End: 2023-12-14

## 2023-12-14 RX ORDER — METHOCARBAMOL 100 MG/ML
1000 INJECTION, SOLUTION INTRAMUSCULAR; INTRAVENOUS ONCE
Status: DISCONTINUED | OUTPATIENT
Start: 2023-12-14 | End: 2023-12-14

## 2023-12-14 RX ORDER — KETOROLAC TROMETHAMINE 10 MG/1
20 TABLET, FILM COATED ORAL EVERY 6 HOURS PRN
Qty: 20 TABLET | Refills: 0 | Status: SHIPPED | OUTPATIENT
Start: 2023-12-14 | End: 2023-12-19

## 2023-12-14 RX ORDER — METHOCARBAMOL 500 MG/1
500 TABLET, FILM COATED ORAL 2 TIMES DAILY
Qty: 20 TABLET | Refills: 0 | Status: SHIPPED | OUTPATIENT
Start: 2023-12-14 | End: 2024-04-18 | Stop reason: SDUPTHER

## 2023-12-14 RX ADMIN — LIDOCAINE 1 PATCH: 4 PATCH TOPICAL at 06:08

## 2023-12-14 RX ADMIN — DEXAMETHASONE 10 MG: 6 TABLET ORAL at 08:13

## 2023-12-14 RX ADMIN — ORPHENADRINE CITRATE 60 MG: 60 INJECTION INTRAMUSCULAR; INTRAVENOUS at 06:08

## 2023-12-14 RX ADMIN — KETOROLAC TROMETHAMINE 60 MG: 30 INJECTION, SOLUTION INTRAMUSCULAR; INTRAVENOUS at 06:08

## 2023-12-14 ASSESSMENT — LIFESTYLE VARIABLES
REASON UNABLE TO ASSESS: NO
EVER HAD A DRINK FIRST THING IN THE MORNING TO STEADY YOUR NERVES TO GET RID OF A HANGOVER: NO
HAVE YOU EVER FELT YOU SHOULD CUT DOWN ON YOUR DRINKING: NO
HAVE PEOPLE ANNOYED YOU BY CRITICIZING YOUR DRINKING: NO
EVER FELT BAD OR GUILTY ABOUT YOUR DRINKING: NO

## 2023-12-14 ASSESSMENT — COLUMBIA-SUICIDE SEVERITY RATING SCALE - C-SSRS
2. HAVE YOU ACTUALLY HAD ANY THOUGHTS OF KILLING YOURSELF?: NO
6. HAVE YOU EVER DONE ANYTHING, STARTED TO DO ANYTHING, OR PREPARED TO DO ANYTHING TO END YOUR LIFE?: NO
1. IN THE PAST MONTH, HAVE YOU WISHED YOU WERE DEAD OR WISHED YOU COULD GO TO SLEEP AND NOT WAKE UP?: NO

## 2023-12-14 ASSESSMENT — PAIN DESCRIPTION - LOCATION: LOCATION: BACK

## 2023-12-14 NOTE — ED TRIAGE NOTES
PT presents to ED via triage for chief complaint of left neck pain,  right lower back pain. PT also endorsing SOB. PT states he was recently seen for his neck pain and discharged. PT has a history of HTN, fibromyalgia, sciatica, GERD, depression and anxiety.

## 2023-12-14 NOTE — ED PROVIDER NOTES
CC: Back Pain, Neck Pain, and Shortness of Breath     HPI: Patient is a 33-year-old male with history of obesity, anxiety, GERD, depression, fibromyalgia, migraine headaches, and chronic sciatica presenting to the emergency department today for right lower back and left neck pain.  Patient reports for the past 2 weeks now has had left-sided neck tenderness that feels like his fibromyalgia pain but worse.  Reports it is worse with neck movements.  Has tried Tylenol/ibuprofen at home without improvement of the symptoms.  Was seen for an identical complaint a week ago and was treated with Norflex and Toradol with improvement of his symptoms.  Also reports the past 48 hours has had right-sided flank and lower lumbar spine pain that he reports is 10 out of 10 today.  Reports he has chronic sciatica of the right lower extremity but it is a little worse today.  Denies fevers, chills, chest pain, shortness of breath, abdominal pain, or change in urination/stool.  No saddle anesthesia or urinary incontinence noted.      Records Reviewed:  Recent available ED and inpatient notes reviewed in EMR.    PMHx/PSHx:  Per HPI.   - has a past medical history of Migraine and Priapism, unspecified.  - has a past surgical history that includes Other surgical history (02/11/2020).  - has Acute bacterial conjunctivitis of both eyes; Anxiety and depression; Depression; Bariatric surgery status; Bilateral retinal lattice degeneration; Blepharitis of both upper and lower eyelid of left eye; Blepharitis of both upper and lower eyelid of right eye; Borderline hyperlipidemia; Cervicalgia; Chronic midline low back pain without sciatica; Fibromyalgia; Chest pain, atypical; Non-cardiac chest pain; Pleuritic pain; Cough; Current episode of major depressive disorder without prior episode; Arthritis; Esophageal obstruction; Esophageal spasm; Esophagogastric junction outflow obstruction; Gastritis; Fatty liver; Folliculitis; Fungal infection of skin;  GERD without esophagitis; Hidradenitis suppurativa; Hypertrophy of breast; Chronic pain of both knees; Left hip pain; Migraine; Morbid obesity (CMS/[facility]); Obesity, Class III, BMI 40-49.9 (morbid obesity) (CMS/[facility]); Obesity; Obstructive sleep apnea syndrome; Palpitations; Penile pain; Prediabetes; Priapism; Primary osteoarthritis of both hips; Psychological factor affecting physical condition; Severe anxiety with panic; Viral URI with cough; and Vitamin D deficiency on their problem list.    Medications:  Reviewed in EMR. See EMR for complete list of medications and doses.    Allergies:  Amitriptyline, Amoxicillin, Citalopram, and Pregabalin    Social History:  - Tobacco:  reports that he has never smoked. He has never used smokeless tobacco.   - Alcohol:  reports no history of alcohol use.   - Illicit Drugs:  reports no history of drug use.     ROS:  Per HPI.       ???????????????????????????????????????????????????????????????  Triage Vitals:  T 36.6 °C (97.8 °F)  HR 71  /64  RR 18  O2 100 % None (Room air)    Physical Exam  Vitals and nursing note reviewed.   Constitutional:       General: He is not in acute distress.     Appearance: He is well-developed. He is obese.   HENT:      Head: Normocephalic and atraumatic.   Eyes:      Conjunctiva/sclera: Conjunctivae normal.   Cardiovascular:      Rate and Rhythm: Normal rate and regular rhythm.      Heart sounds: No murmur heard.  Pulmonary:      Effort: Pulmonary effort is normal. No respiratory distress.      Breath sounds: Normal breath sounds.   Abdominal:      Palpations: Abdomen is soft.      Tenderness: There is no abdominal tenderness.   Musculoskeletal:         General: No swelling.      Cervical back: Neck supple.      Lumbar back: Tenderness present. Positive right straight leg raise test and positive left straight leg raise test.      Comments: Moderate-severe L2 lumbar spine tenderness to palpation with Right flank pain radiating down the right  leg.    Skin:     General: Skin is warm and dry.      Capillary Refill: Capillary refill takes less than 2 seconds.   Neurological:      Mental Status: He is alert.   Psychiatric:         Mood and Affect: Mood normal.     ???????????????????????????????????????????????????????????????  EKG:   Regular rate, regular rhythm.  NJ interval borderline at 194.  QRS, QTc intervals within normal limits.  No ST elevations, depressions, T wave inversions.  Normal sinus rhythm.      Medical Decision Making   Patient is a 33-year-old male present emergency department today for lower back/right flank pain and left neck pain.  On arrival, vital signs within normal limits.  On examination, patient has mild tenderness palpation of the anterior neck region.  Also has tenderness to palpation of the lumbar spine region extending to the right flank.  No dysuria or increased frequency noted, lower concern for UTI today.  Patient reports he has not had a fall, no other trauma to the area.  It is possible that he has chronic degenerative changes versus an acute disc injury to the lumbar spine region.  Pain treated with Robaxin and Toradol here in the emergency department IM.  Do not think labs are indicated at this time.  Straight leg test positive.  Patient's lumbar spine localized to the L2 region with moderate to severe tenderness.  At this time we are concerned about vertebral fracture versus possible disc herniation and we will get a CT lumbar spine for further evaluation.    Update: CT lumbar spine showed left paracentral disc bulge at L2-L3 that may impinge on the descending or exiting nerve roots.  Lower concern for acute fracture or spinal cord compression at this time. No red flag symptoms, no s/s of cauda equina. Given Decadron here in the emergency department and will be sent home with prescriptions for Robaxin and Toradol.  Will give him a referral to the spine clinic today and recommended following up closely with his PCP if  symptoms do not improve in the next week    Diagnoses as of 12/14/23 2239   Acute midline low back pain with right-sided sciatica       Social Determinants Limiting Care:  None identified    Disposition:   Discharge    Deep Castro MD  Emergency Medicine PGY2      Procedures ? Smart[employer] last updated 12/14/2023 6:36 AM          Deep Castro MD  Resident  12/14/23 8240

## 2024-02-13 ENCOUNTER — NUTRITION (OUTPATIENT)
Dept: SURGERY | Facility: HOSPITAL | Age: 34
End: 2024-02-13
Payer: COMMERCIAL

## 2024-02-13 DIAGNOSIS — E66.01 CLASS 3 SEVERE OBESITY WITH SERIOUS COMORBIDITY AND BODY MASS INDEX (BMI) OF 60.0 TO 69.9 IN ADULT, UNSPECIFIED OBESITY TYPE (MULTI): Primary | ICD-10-CM

## 2024-02-13 NOTE — PROGRESS NOTES
"Surgeon: Renetta  Patient is considering:  sleeve gastrectomy    ASSESSMENT:  Current weight:  420 lbs (12/14/23)  Ht: 66  in   BMI: 67.79       Initial start weight: 420 lbs  Pre-Op Excess Body Weight (EBW):   265 lbs  Target Post-Op weight goal:  247 - 287 lbs    Food allergies/intolerances:  NKFA  Chewing/Swallowing/Dentition:  none  Nausea / Vomiting / Hx Gastroparesis:  denies  Diarrhea/ Constipation: denies  Exercise level:  10-15 minutes 3 times/week - cardio/weights   Smoking/Tobacco use: none  Vitamins/Minerals supplements:  none   Medications:   see list  Past diet attempts:   exercise   Hours of sleep/night: 4, +sleep apnea, has CPAP    24 HOUR RECALL/DIET HISTORY:  Breakfast:  4:30 AM pork sausage x2 links  Snack:    Lunch: 11AM - toast peanut butter   Snack: 2PM cashews, peanuts  Dinner: 4:30/5 PM: plate of loaded potato (red potatoes, green peppers, onions, mushroom, cheese, ranch. Shredded chicken)  Snack: none   Beverages: water with crystal light, propel, powerade, diet tea   Alcohol: vodka - weekends 3-4 shots     Person responsible for cooking & shopping? Self mainly - sometimes family member helps out 3 times/week   Grocery trips per week/month: once   Grocery stores frequents: [employer], [employer], [employer], [employer]   How often do you eat sweet snacks?  PB milkshake \"every now and then\" - maybe twice/month  How often do you eat savory snacks? Chips and dip - 3 times/week if in the house   How often do you eat out?  Special Occasions, [employer] maybe 2/month   Do you feel overly stuffed?  Sometimes   Binge Eating? Denies   Night Eating?  Denies   Emotional Eating?  Yes, more so liquor        READINESS TO LEARN:  Motivation to learn: Interested        Understanding of instruction: Good    Anticipated Compliance: Good        Family Support: Unable to assess-family not present          Educational Materials Provided:    Plate Method  High Protein Snack List  High Protein Drink  High Protein food " list  Schedules for MSWL class  Goals sheet    Patient's weight is self-reported. Patient will scheduled to attend a Virtual Education Class to review the 2 week Pre-op diet, Post op fluid, protein, vitamin/mineral supplementation, exercise goals and post op diet progression.    Patient presents with excessive calorie obesity seeking  sleeve gastrectomy.    Patient seen today to complete nutrition evaluation for weight loss surgery. Pt was previously in program in 2020/21, but dropped out. Patient reports he has been trying to eat less calories, drink less alcohol, and exercise regularly to help lose weight. Patient does report he relies on ETOH to manage stress. Made comment several times during appointment of being more stressed lately, but did not disclose why. This was a concern in the past. Patient reports he does not frequent fast food. Pt reports Eating 2 meals/day, sometimes snacks in the afternoon. Pt identifies emotional drinking and snacking to be a dietary weakness.  Recommended to begin to eat 3 meals/day, avoid skipping meals. Reviewed balanced meals by using plate method. Encouraged to eat protein rich foods at each meal, balanced with fiber rich foods. Reviewed fluids to eliminate and replace with SF, non-carbonated, caffeine free fluids. Reviewed postop behaviors and encouraged to begin practicing. Recommended to start daily MVI. Recommended to begin exercising  for 4 days/week for 30 minutes.     Patient was receptive to nutritional recommendations, asked numerous questions, and verbalized understanding of the weight loss surgery diet.  Patient expressed understanding about the importance of strict dietary compliance post-surgery to avoid nutritional deficiencies and achieve optimal weight loss and verbalized intent to follow dietary recommendations.      Malnutrition Screening:  Significant unintentional weight loss? No  Eating less than 75% of usual intake for more than 2 weeks? No      Nutrition  Diagnosis:   1. Overweight/obesity related to excess energy intake as evidenced by BMI = 40 kg/m^2.  2. Food- and nutrition-related knowledge deficit related to lack of prior exposure to surgical weight loss information as evidenced by pt new to surgical program.    Nutrition Interventions:   1. Modify type and amount of food and nutrients within meals and snacks.  2. Comprehensive Nutrition Education    Recommendations:  1. Seat 3 meals daily, 1 snack mid-day as needed.  2. Have a good source of protein at every meal & snack.  Aim for 3-5 ounces (20-40 grams) lean proteins with meals.   3. Drink 64oz of calorie-free, caffeine-free, and non-carbonated beverages.   4. Practice no drinking with meals. Practice small sips of fluid, avoid chugging and gulping.    5. Select snacks that provide protein and fiber to keep you justin longer - see list of options provided  7. Begin daily multivitamin.  Centrum complete has everything you need.  8. Increase physical activity by 10-15 minutes as tolerated to an end goal of 60 minutes 5 x per week. Consistency is the key.  9. Your insurance requires 6 months of Medically Supervised Weight Loss (MSWL) classes. You will need to attend a class in March. We will follow up for your 3rd visit on Monday, April 15th at 9:00 AM. You will need to weigh yourself before this appointment and provide a 24 hour food recall.      Pre-op Goal weight: lose 5% of body weight    Nutrition Monitoring and Evaluation: 1-2 pound weight loss per week  Criteria: weight check  Need for Follow-up:     Patient does meet [employer] guidelines for weight loss surgery, however needs to demonstrate consistent effort in making dietary changes before giving clearance. It is anticipated that the patient will need at least 2  nutritional follow-up visits prior to clearance for surgery.

## 2024-03-20 ENCOUNTER — CLINICAL SUPPORT (OUTPATIENT)
Dept: EMERGENCY MEDICINE | Facility: HOSPITAL | Age: 34
End: 2024-03-20
Payer: COMMERCIAL

## 2024-03-20 ENCOUNTER — APPOINTMENT (OUTPATIENT)
Dept: RADIOLOGY | Facility: HOSPITAL | Age: 34
End: 2024-03-20
Payer: COMMERCIAL

## 2024-03-20 ENCOUNTER — HOSPITAL ENCOUNTER (EMERGENCY)
Facility: HOSPITAL | Age: 34
Discharge: HOME | End: 2024-03-20
Attending: EMERGENCY MEDICINE
Payer: COMMERCIAL

## 2024-03-20 VITALS
DIASTOLIC BLOOD PRESSURE: 74 MMHG | TEMPERATURE: 97.7 F | SYSTOLIC BLOOD PRESSURE: 128 MMHG | RESPIRATION RATE: 16 BRPM | OXYGEN SATURATION: 100 % | WEIGHT: 315 LBS | HEIGHT: 65 IN | HEART RATE: 72 BPM | BODY MASS INDEX: 52.48 KG/M2

## 2024-03-20 DIAGNOSIS — R07.9 CHEST PAIN, UNSPECIFIED TYPE: ICD-10-CM

## 2024-03-20 DIAGNOSIS — N48.30 PRIAPISM: Primary | ICD-10-CM

## 2024-03-20 LAB
ALBUMIN SERPL BCP-MCNC: 4 G/DL (ref 3.4–5)
ALP SERPL-CCNC: 47 U/L (ref 33–120)
ALT SERPL W P-5'-P-CCNC: 32 U/L (ref 10–52)
ANION GAP BLDA CALCULATED.4IONS-SCNC: 21 MMO/L (ref 10–25)
ANION GAP BLDV CALCULATED.4IONS-SCNC: 9 MMOL/L (ref 10–25)
ANION GAP SERPL CALC-SCNC: 12 MMOL/L (ref 10–20)
AST SERPL W P-5'-P-CCNC: 28 U/L (ref 9–39)
ATRIAL RATE: 70 BPM
BASE EXCESS BLDA CALC-SCNC: -8.4 MMOL/L (ref -2–3)
BASE EXCESS BLDV CALC-SCNC: 3.4 MMOL/L (ref -2–3)
BASOPHILS # BLD AUTO: 0.07 X10*3/UL (ref 0–0.1)
BASOPHILS NFR BLD AUTO: 0.8 %
BILIRUB SERPL-MCNC: 0.5 MG/DL (ref 0–1.2)
BODY TEMPERATURE: 37 DEGREES CELSIUS
BODY TEMPERATURE: 37 DEGREES CELSIUS
BUN SERPL-MCNC: 11 MG/DL (ref 6–23)
CA-I BLDA-SCNC: 1.2 MMOL/L (ref 1.1–1.33)
CA-I BLDV-SCNC: 1.18 MMOL/L (ref 1.1–1.33)
CALCIUM SERPL-MCNC: 9.3 MG/DL (ref 8.6–10.6)
CARDIAC TROPONIN I PNL SERPL HS: <3 NG/L (ref 0–53)
CARDIAC TROPONIN I PNL SERPL HS: <3 NG/L (ref 0–53)
CHLORIDE BLDA-SCNC: 101 MMOL/L (ref 98–107)
CHLORIDE BLDV-SCNC: 101 MMOL/L (ref 98–107)
CHLORIDE SERPL-SCNC: 102 MMOL/L (ref 98–107)
CO2 SERPL-SCNC: 28 MMOL/L (ref 21–32)
CREAT SERPL-MCNC: 0.94 MG/DL (ref 0.5–1.3)
D DIMER PPP FEU-MCNC: 942 NG/ML FEU
EGFRCR SERPLBLD CKD-EPI 2021: >90 ML/MIN/1.73M*2
EOSINOPHIL # BLD AUTO: 0.27 X10*3/UL (ref 0–0.7)
EOSINOPHIL NFR BLD AUTO: 3.3 %
ERYTHROCYTE [DISTWIDTH] IN BLOOD BY AUTOMATED COUNT: 15.9 % (ref 11.5–14.5)
GLUCOSE BLDA-MCNC: 9 MG/DL (ref 74–99)
GLUCOSE BLDV-MCNC: 106 MG/DL (ref 74–99)
GLUCOSE SERPL-MCNC: 104 MG/DL (ref 74–99)
HCO3 BLDA-SCNC: 23 MMOL/L (ref 22–26)
HCO3 BLDV-SCNC: 30.4 MMOL/L (ref 22–26)
HCT VFR BLD AUTO: 41.1 % (ref 41–52)
HCT VFR BLD EST: 17 % (ref 41–52)
HCT VFR BLD EST: 43 % (ref 41–52)
HGB BLD-MCNC: 13.6 G/DL (ref 13.5–17.5)
HGB BLDA-MCNC: 5.7 G/DL (ref 13.5–17.5)
HGB BLDV-MCNC: 14.3 G/DL (ref 13.5–17.5)
HOLD SPECIMEN: NORMAL
IMM GRANULOCYTES # BLD AUTO: 0.03 X10*3/UL (ref 0–0.7)
IMM GRANULOCYTES NFR BLD AUTO: 0.4 % (ref 0–0.9)
INHALED O2 CONCENTRATION: 21 %
INR PPP: 1 (ref 0.9–1.1)
LACTATE BLDA-SCNC: 8.7 MMOL/L (ref 0.4–2)
LACTATE BLDV-SCNC: 1.1 MMOL/L (ref 0.4–2)
LYMPHOCYTES # BLD AUTO: 2.6 X10*3/UL (ref 1.2–4.8)
LYMPHOCYTES NFR BLD AUTO: 31.3 %
MAGNESIUM SERPL-MCNC: 2.55 MG/DL (ref 1.6–2.4)
MCH RBC QN AUTO: 24.1 PG (ref 26–34)
MCHC RBC AUTO-ENTMCNC: 33.1 G/DL (ref 32–36)
MCV RBC AUTO: 73 FL (ref 80–100)
MONOCYTES # BLD AUTO: 0.51 X10*3/UL (ref 0.1–1)
MONOCYTES NFR BLD AUTO: 6.1 %
NEUTROPHILS # BLD AUTO: 4.82 X10*3/UL (ref 1.2–7.7)
NEUTROPHILS NFR BLD AUTO: 58.1 %
NRBC BLD-RTO: 0 /100 WBCS (ref 0–0)
OXYHGB MFR BLDA: 9 % (ref 94–98)
OXYHGB MFR BLDV: 30.6 % (ref 45–75)
P AXIS: 33 DEGREES
P OFFSET: 184 MS
P ONSET: 120 MS
PCO2 BLDA: 107 MM HG (ref 38–42)
PCO2 BLDV: 55 MM HG (ref 41–51)
PH BLDA: 6.94 PH (ref 7.38–7.42)
PH BLDV: 7.35 PH (ref 7.33–7.43)
PLATELET # BLD AUTO: 350 X10*3/UL (ref 150–450)
PO2 BLDA: 16 MM HG (ref 85–95)
PO2 BLDV: 29 MM HG (ref 35–45)
POTASSIUM BLDA-SCNC: 4.2 MMOL/L (ref 3.5–5.3)
POTASSIUM BLDV-SCNC: 4 MMOL/L (ref 3.5–5.3)
POTASSIUM SERPL-SCNC: 3.8 MMOL/L (ref 3.5–5.3)
PR INTERVAL: 192 MS
PROT SERPL-MCNC: 7.8 G/DL (ref 6.4–8.2)
PROTHROMBIN TIME: 11.5 SECONDS (ref 9.8–12.8)
Q ONSET: 229 MS
QRS COUNT: 12 BEATS
QRS DURATION: 80 MS
QT INTERVAL: 376 MS
QTC CALCULATION(BAZETT): 406 MS
QTC FREDERICIA: 396 MS
R AXIS: 62 DEGREES
RBC # BLD AUTO: 5.65 X10*6/UL (ref 4.5–5.9)
SAO2 % BLDA: 9 % (ref 94–100)
SAO2 % BLDV: 31 % (ref 45–75)
SODIUM BLDA-SCNC: 141 MMOL/L (ref 136–145)
SODIUM BLDV-SCNC: 136 MMOL/L (ref 136–145)
SODIUM SERPL-SCNC: 138 MMOL/L (ref 136–145)
SPECIMEN DRAWN FROM PATIENT: ABNORMAL
T AXIS: 69 DEGREES
T OFFSET: 417 MS
VENTRICULAR RATE: 70 BPM
WBC # BLD AUTO: 8.3 X10*3/UL (ref 4.4–11.3)

## 2024-03-20 PROCEDURE — 93005 ELECTROCARDIOGRAM TRACING: CPT | Mod: 59

## 2024-03-20 PROCEDURE — 71045 X-RAY EXAM CHEST 1 VIEW: CPT | Mod: FOREIGN READ | Performed by: RADIOLOGY

## 2024-03-20 PROCEDURE — 71275 CT ANGIOGRAPHY CHEST: CPT

## 2024-03-20 PROCEDURE — 36415 COLL VENOUS BLD VENIPUNCTURE: CPT | Performed by: EMERGENCY MEDICINE

## 2024-03-20 PROCEDURE — 2550000001 HC RX 255 CONTRASTS: Mod: SE | Performed by: EMERGENCY MEDICINE

## 2024-03-20 PROCEDURE — 93005 ELECTROCARDIOGRAM TRACING: CPT

## 2024-03-20 PROCEDURE — 2500000004 HC RX 250 GENERAL PHARMACY W/ HCPCS (ALT 636 FOR OP/ED): Mod: SE | Performed by: STUDENT IN AN ORGANIZED HEALTH CARE EDUCATION/TRAINING PROGRAM

## 2024-03-20 PROCEDURE — 96374 THER/PROPH/DIAG INJ IV PUSH: CPT | Mod: 59

## 2024-03-20 PROCEDURE — 99285 EMERGENCY DEPT VISIT HI MDM: CPT | Mod: 25

## 2024-03-20 PROCEDURE — 85610 PROTHROMBIN TIME: CPT | Performed by: STUDENT IN AN ORGANIZED HEALTH CARE EDUCATION/TRAINING PROGRAM

## 2024-03-20 PROCEDURE — 85025 COMPLETE CBC W/AUTO DIFF WBC: CPT | Performed by: EMERGENCY MEDICINE

## 2024-03-20 PROCEDURE — 85379 FIBRIN DEGRADATION QUANT: CPT | Performed by: STUDENT IN AN ORGANIZED HEALTH CARE EDUCATION/TRAINING PROGRAM

## 2024-03-20 PROCEDURE — 84484 ASSAY OF TROPONIN QUANT: CPT | Performed by: EMERGENCY MEDICINE

## 2024-03-20 PROCEDURE — 96376 TX/PRO/DX INJ SAME DRUG ADON: CPT | Mod: 59

## 2024-03-20 PROCEDURE — 54220 IRRG CRPRA CAVRNOSA PRIAPISM: CPT

## 2024-03-20 PROCEDURE — 84132 ASSAY OF SERUM POTASSIUM: CPT | Performed by: STUDENT IN AN ORGANIZED HEALTH CARE EDUCATION/TRAINING PROGRAM

## 2024-03-20 PROCEDURE — 99285 EMERGENCY DEPT VISIT HI MDM: CPT | Performed by: EMERGENCY MEDICINE

## 2024-03-20 PROCEDURE — 84132 ASSAY OF SERUM POTASSIUM: CPT

## 2024-03-20 PROCEDURE — 83735 ASSAY OF MAGNESIUM: CPT | Performed by: STUDENT IN AN ORGANIZED HEALTH CARE EDUCATION/TRAINING PROGRAM

## 2024-03-20 PROCEDURE — 84132 ASSAY OF SERUM POTASSIUM: CPT | Performed by: EMERGENCY MEDICINE

## 2024-03-20 PROCEDURE — 71045 X-RAY EXAM CHEST 1 VIEW: CPT

## 2024-03-20 PROCEDURE — 93010 ELECTROCARDIOGRAM REPORT: CPT | Performed by: EMERGENCY MEDICINE

## 2024-03-20 PROCEDURE — 71275 CT ANGIOGRAPHY CHEST: CPT | Mod: FOREIGN READ | Performed by: RADIOLOGY

## 2024-03-20 RX ORDER — LIDOCAINE HYDROCHLORIDE 10 MG/ML
10 INJECTION INFILTRATION; PERINEURAL ONCE
Status: DISCONTINUED | OUTPATIENT
Start: 2024-03-20 | End: 2024-03-20 | Stop reason: HOSPADM

## 2024-03-20 RX ORDER — PANTOPRAZOLE SODIUM 20 MG/1
20 TABLET, DELAYED RELEASE ORAL DAILY
Qty: 20 TABLET | Refills: 0 | Status: SHIPPED | OUTPATIENT
Start: 2024-03-20 | End: 2024-04-18 | Stop reason: SDUPTHER

## 2024-03-20 RX ORDER — PHENYLEPHRINE HCL IN 0.9% NACL 0.4MG/10ML
100 SYRINGE (ML) INTRAVENOUS ONCE
Status: DISCONTINUED | OUTPATIENT
Start: 2024-03-20 | End: 2024-03-20 | Stop reason: HOSPADM

## 2024-03-20 RX ORDER — MORPHINE SULFATE 4 MG/ML
2 INJECTION INTRAVENOUS ONCE
Status: COMPLETED | OUTPATIENT
Start: 2024-03-20 | End: 2024-03-20

## 2024-03-20 RX ORDER — MORPHINE SULFATE 4 MG/ML
4 INJECTION INTRAVENOUS ONCE
Status: COMPLETED | OUTPATIENT
Start: 2024-03-20 | End: 2024-03-20

## 2024-03-20 RX ORDER — NAPROXEN 500 MG/1
500 TABLET ORAL
Qty: 30 TABLET | Refills: 0 | Status: SHIPPED | OUTPATIENT
Start: 2024-03-20 | End: 2024-04-04

## 2024-03-20 RX ADMIN — MORPHINE SULFATE 4 MG: 4 INJECTION INTRAVENOUS at 04:21

## 2024-03-20 RX ADMIN — SODIUM CHLORIDE 1000 ML: 9 INJECTION, SOLUTION INTRAVENOUS at 04:20

## 2024-03-20 RX ADMIN — MORPHINE SULFATE 2 MG: 4 INJECTION INTRAVENOUS at 07:26

## 2024-03-20 RX ADMIN — IOHEXOL 95 ML: 350 INJECTION, SOLUTION INTRAVENOUS at 06:10

## 2024-03-20 ASSESSMENT — LIFESTYLE VARIABLES
EVER FELT BAD OR GUILTY ABOUT YOUR DRINKING: NO
HAVE YOU EVER FELT YOU SHOULD CUT DOWN ON YOUR DRINKING: NO
EVER HAD A DRINK FIRST THING IN THE MORNING TO STEADY YOUR NERVES TO GET RID OF A HANGOVER: NO
HAVE PEOPLE ANNOYED YOU BY CRITICIZING YOUR DRINKING: NO

## 2024-03-20 ASSESSMENT — COLUMBIA-SUICIDE SEVERITY RATING SCALE - C-SSRS
2. HAVE YOU ACTUALLY HAD ANY THOUGHTS OF KILLING YOURSELF?: NO
1. IN THE PAST MONTH, HAVE YOU WISHED YOU WERE DEAD OR WISHED YOU COULD GO TO SLEEP AND NOT WAKE UP?: NO
6. HAVE YOU EVER DONE ANYTHING, STARTED TO DO ANYTHING, OR PREPARED TO DO ANYTHING TO END YOUR LIFE?: NO

## 2024-03-20 ASSESSMENT — PAIN SCALES - GENERAL
PAINLEVEL_OUTOF10: 6
PAINLEVEL_OUTOF10: 10 - WORST POSSIBLE PAIN

## 2024-03-20 ASSESSMENT — PAIN - FUNCTIONAL ASSESSMENT: PAIN_FUNCTIONAL_ASSESSMENT: 0-10

## 2024-03-20 ASSESSMENT — PAIN DESCRIPTION - LOCATION: LOCATION: CHEST

## 2024-03-20 NOTE — ED TRIAGE NOTES
Pt states that he has had constant chest pain all across his chest that radiates to his neck and back for the past two days, he denies sob. Pt is also complaining of priapism, he states that it has been going on since 2000.

## 2024-03-20 NOTE — ED PROVIDER NOTES
HPI   Chief Complaint   Patient presents with    Chest Pain       Patient is a 33-year-old male presents the emergency department today due to concern for chest pain.  Visit.  Patient states that he had chest pain that began a week prior.  He reports sharp pain going across his chest.  He denies any associated shortness of breath.  Did report some pain with inspiration.  He denies any history of blood clots.  He does also report that he has had priapism that is been lasting for 8 hours.  He has had multiple episodes of this in the past requiring drainage.  He denies any history of sickle cell disease.  States he had this drained a few weeks prior.      History provided by:  Patient   used: No                        [place] Coma Scale Score: 15                     Patient History   Past Medical History:   Diagnosis Date    Migraine     Priapism, unspecified 03/13/2015    Priapism     Past Surgical History:   Procedure Laterality Date    OTHER SURGICAL HISTORY  02/11/2020    No history of surgery     No family history on file.  Social History     Tobacco Use    Smoking status: Never    Smokeless tobacco: Never   Vaping Use    Vaping Use: Never used   Substance Use Topics    Alcohol use: Never    Drug use: Never       Physical Exam   ED Triage Vitals [03/20/24 0243]   Temperature Heart Rate Respirations BP   36.5 °C (97.7 °F) 69 16 108/66      Pulse Ox Temp Source Heart Rate Source Patient Position   96 % Temporal Monitor --      BP Location FiO2 (%)     -- 21 %       Physical Exam  Vitals and nursing note reviewed.   Constitutional:       General: He is not in acute distress.     Appearance: He is well-developed.   HENT:      Head: Normocephalic and atraumatic.   Eyes:      Conjunctiva/sclera: Conjunctivae normal.   Cardiovascular:      Rate and Rhythm: Normal rate and regular rhythm.      Heart sounds: No murmur heard.  Pulmonary:      Effort: Pulmonary effort is normal. No respiratory distress.       Breath sounds: Normal breath sounds.   Abdominal:      Palpations: Abdomen is soft.      Tenderness: There is no abdominal tenderness.   Genitourinary:     Comments: Patient with priapism.  No testicular swelling or edema noted.  Musculoskeletal:         General: No swelling.      Cervical back: Neck supple.   Skin:     General: Skin is warm and dry.      Capillary Refill: Capillary refill takes less than 2 seconds.   Neurological:      Mental Status: He is alert.   Psychiatric:         Mood and Affect: Mood normal.         ED Course & MDM   ED Course as of 03/21/24 1356   Wed Mar 20, 2024   0427 Patient is a 33-year-old male presents to the ER due to concern for chest pain previous him.  On arrival, patient was in no acute distress.  Vitals were stable.  Did get cardiac workup for patient.  Also ordered D-dimer given pleuritic nature of pain.  Did also discussed patient with urology given history of priapism requiring drainage.  Did request lidocaine and phenylephrine for drainage and injection.   [MJ]   0428 XR chest 1 view  IMPRESSION:  No acute cardiopulmonary disease.   [MJ]   0546 CBC with Differential(!)  Unremarkable [MJ]   0546 MAGNESIUM(!): 2.55 [MJ]   0546 Comprehensive Metabolic Panel(!)  Unremarkable [MJ]   0546 Troponin I, High Sensitivity: <3  Troponin x 2 stable [MJ]   0547 . [MJ]   0547 D-Dimer, Quantitative VTE Exclusion(!): 942  D-dimer elevated, CT angio chest ordered to assess for PE [MJ]   0659 CT angio chest for pulmonary embolism  IMPRESSION:  Limited significantly by artifact related to overlying extensive soft  tissue--allowing for this:  No CT evidence of pulmonary embolism.   Normal thoracic aorta.   Normal pulmonary vessels.   No focal airspace disease.   No pleural effusion.   No pneumothorax.   [MJ]   0700 Patient's.  Patient was drained by urology.  On my reassessment the patient after multiple hours of observation after drainage, patient was no longer endorsing any penile pain  or preop is him.  His chest pain is also improved.  He did feel comfortable being discharged home given unremarkable workup.  Instructed to follow-up with urology.  Did offer him sildenafil per urology note however he did not feel comfortable with going home with this and will follow with urology.  In regards to patient's chest pain, he was reporting some GERD like symptoms.  Did offer prescription of Protonix which she was agreeable to.  Strict return precautions were given to patient.  He was understanding and agreeable with plan for discharge. [MJ]      ED Course User Index  [MJ] Troy Mejía DO         Diagnoses as of 03/21/24 1356   Priapism   Chest pain, unspecified type       Medical Decision Making      Procedure  Procedures    Attending Note:  The patient was seen by the resident/fellow/ROSA ELENA.  I have personally performed a substantive portion of the encounter.  I have seen and examined the patient; agree with the workup, evaluation, MDM, management and diagnosis with the exception/addition of the following.  The care plan has been discussed with the resident/fellow; I have reviewed the resident/fellow’s note and agree with the documented findings with the exception/addition of the following:       HPI:   Luis Mcgill is a 33 year old male with history of obesity, migraines, Hidradenitis suppurativa, and multiple episodes of priapism presenting to the ED for evaluation. The patient reports that at approximately 2000 on 3/19, he developed an erection which has persisted without detumescence.  He denies any penile lesions or scrotal ulcers.  He denies any testicular pain or swelling. No history of sickle cell. Denies history of penile trauma, nor neoplastic process. He also reports several day history of bilateral anterior chest wall pain, constant over at least 3 days, non-radiating, worsened by deep inspiration and palpation, improved by nothing.  He denies any recent travel, immobilization,  malignancy, or prior DVT/PE. No calf pain or leg swelling. He reports family history of CAD.  He denies personal or family history of aneurysm, dissection, or connective tissue disorder.    Per chart review, seen in the ED recently at [facility] on 3/5/24 for priapism, which was drained in the ED and patient discharged.     Additional History Obtained from: See HPI       Physical Exam:  General: Grossly well-developed, awake, alert, NAD    Head: Normocephalic, no overt external signs of trauma    ENT: Mucus membranes moist, nares patent    Neck: Supple, trachea midline, no bruits apparent    Neurologic: A&Ox4, FS, WILKINSON x 4 with grossly symmetric strength, SILT grossly intact BUE and BLE    Cardiovascular: RRR, pulses grossly symmetric BUE and BLE    Respiratory: CTA B/L, no wheeze, rales or rhonchi    Abdomen: Soft, not appreciably tender, no evident rebound/guarding, no pulsatile masses palpable.     : Examined with patient consent and RN chaperone, circumcised male, no obvious penile lesions or ulcers. Normal testicular position and lie. No scrotal TTP or obvious masses.     Back: No apparent CVA TTP    MSK: No gross bony deformities in BUE and BLE    Skin/Integumentary: Warm and dry    Psychiatric: Calm and cooperative. Normal mood and affect.       EKG Interpretation:  EKG 3/20/24 - 0248 - sinus rhythm, rate 70, axis normal, intervals , QRS 80, QTc 406, limited interpretation V6 2/2 background artifact but no apparent significant acute ST elevation or depression otherwise. Rate increased from 66 on 12/14/23.   Interpreted by provider as above      Differential Diagnoses Considered:  See MDM below    Chronic Medical Conditions Significantly Affecting Care:  See MDM below    External Records Reviewed:  I reviewed recent and relevant outside records as noted in HPI above and MDM below.     Independent Interpretation of Studies:    Laboratory/Imaging Studies:  Laboratory results personally reviewed and independently  interpreted:  CBC without significant anemia, thrombocytopenia or leukocytosis  Metabolic panel without MINI, HAGMA or significant electrolyte anomalies. No transaminitis or hyperbilirubinemia   HS troponin not elevated.    D-dimer 942  Mag 2.55  INR WNL  ABG (from priapism) 6.94/104/16)      Radiology imaging and preliminary and/or final reports personally reviewed/independently interpreted:  CXR   IMPRESSION:  No acute cardiopulmonary disease.    CT PE  No CT evidence of pulmonary embolism.   Normal thoracic aorta.   Normal pulmonary vessels.   No focal airspace disease.   No pleural effusion.   No pneumothorax.      Social Determinants of Health Significantly Affecting Care:  See HPI/MDM    Prescription Drug Consideration:  See MDM    Diagnostic testing considered:  See MDM and relevant sections      Medical Decision Making/Course:  33 year old male with history of obesity, migraines, Hidradenitis suppurativa, and multiple episodes of priapism presenting to the ED for evaluation. The patient reported chest pain, constant over the last few days.  There was no calf swelling/pain, recent long travel/immobilization, malignancy, or major risk factors or history concerning for DVT/PE. He did report some degree of pain with inspiration, and hence D-dimer ordered which was elevated. CT PE obtained and negative for PE. No calf pain, swelling or signs of DVT. There were no obvious EKG changes concerning for pericarditis.  History and exam less  consistent with myocarditis.  EKG and troponin, as well as history and risk factors, were not consistent with ACS.  CT/CXR  without signs of pneumothorax, large pneumonia, large effusion, or other acute thoracic process.  Pulses were grossly symmetric and there was no tearing chest pain to the back, or history/exam findings consistent with aortic dissection  No pusatile abdominal mass or signs of ruptured AAA. He denies any neck pain or back pain at present. Neck supple without signs  of menigismus, and he denies trauma or fall. Patient given IVF and analgesia with some improvement of symptoms. Possible component of MSK etiology. Given prescription for NSAID, and PPI given NSAID and if component of GI process for symptoms. He felt comfortable with outpatient follow-up, and strict warning signs/precautions for return discussed regarding above. In regard to priapism, urology consulted, who performed aspiration and irrigation, with detumescence and without recurrence of symptoms. ABG from drainage suggestive of low-flow priapism. He tolerated procedure well and was comfortable with discharge and outpatient follow-up with urology. Strict warning signs/precautions for return to the ED were discussed.  Instructed to follow-up with urology and PCP/FP/IM clinic.  The patient was instructed to follow-up as discussed.  All available ED results from this visit were discussed. Any new prescription medications from the ED were discussed including common side effects/instructions for use. They were also instructed to return to the ED immediately if symptoms worsened, failed to improve, or if they developed any new symptoms/concerns.  An opportunity to ask questions was provided and all were addressed at that time.  The patient verbalized understanding and was in agreement with plan.          Troy Mejía,   Resident  03/21/24 9116       Henry Ennis MD  03/23/24 5045

## 2024-03-20 NOTE — CONSULTS
"Reason For Consult  priapism    History Of Present Illness  Luis Mcgill is a 33 y.o. male presenting with 8 hr hx of priapism. Patient with extensive history of priapism, with over 40 episodes many of which required drainage. Patient worked up 5 years ago with no cause found. Per reports, patient previously on SSRIs which he states he no longer takes. Patient's priapisms recently returned with 11 since his prior workup, 2 of which required drainage. Denies SCD, trauma, or neoplasm.    Pmh: arthritis, GERD, hidradenitis, NICOLE, priapism  Psh: denies  Meds: denies current meds  NKDA     Past Medical History  He has a past medical history of Migraine and Priapism, unspecified (03/13/2015).    Surgical History  He has a past surgical history that includes Other surgical history (02/11/2020).     Social History  He reports that he has never smoked. He has never used smokeless tobacco. He reports that he does not drink alcohol and does not use drugs.    Family History  No family history on file.     Allergies  Amitriptyline, Amoxicillin, Citalopram, and Pregabalin    Review of Systems  12 pt ROS reviewed and negative except as in HPI      Physical Exam  VITALS: Vital signs reviewed  GEN: Alert and conversant; no acute distress  HEENT: NCAT; moist mucus membranes  EYES: Sclera anicteric, EOMI  PULM: Symmetric chest rise, no increased work of breathing on room air, no respiratory distress  CV: Regular rate   ABD: Soft, non-tender, non-distended  : hard and erect penis  MSK: Moving all extremities spontaneously and to command  EXTR: No joint swelling, no significant peripheral edema  NEURO: A&O x 3, no focal neurologic deficits  PSYCH: Appropriate mood and affect       Last Recorded Vitals  Blood pressure 108/66, pulse 69, temperature 36.5 °C (97.7 °F), temperature source Temporal, resp. rate 16, height 1.651 m (5' 5\"), weight (!) 191 kg (420 lb), SpO2 96 %.    Relevant Results  Cr 0.94  WBC 8.3    Procedure  Started " on O2, IVF, tele, and pain meds while in ED without resolution after observation. Discussed bedside aspiration to which patient was agreeable and verbally consented after discussion of risks, benefits, and alternative.     The patient was lying in the ED supine, attached to telemetry. The penis was prepped and draped in the usual sterile fashion. Betadine was used to sterilely prep the penis. A dorsal penile block was performed using 20cc of 1% lidocaine without epi; small amount of local injected at 12, 3 and 9 o'clock position around base of penis as well as at the glans near desired insertion point. An 16 gauge angiocatheter needle was then inserted through the glans, and into the corpora cavernosa. There was immediate expulsion of dark red blood from the angiocatheter. Blood was extracted through the catheter while the penis was manually wringed from the proximal to distal.  A total of 5 cc of 40mcg/ml phenylephrine administered in total; normal saline was injected every 5 - 10 minutes, under careful telemetry monitoring. Re-examination after 15 minutes demonstrated detumescence and angiocatheter were extracted.    Assessment/Plan   34 yo M with multiple ischemic priapisms presenting with priapism today. Manual aspiration and irrigation completed.    Recommendations  - observe in the ED after aspiration  - maintain coban 24 hours  - reasonable to start Sildenafil 25 mg daily (starting in one week) for recurrent ischemic priapism (if not on nitrates)  - will arrange follow up, patient wishes to be seen at     Jaylan Liu MD

## 2024-03-20 NOTE — DISCHARGE INSTRUCTIONS
Follow-up with your primary care provider to discuss your ER visit.  Please also follow-up with urology.  Please take the medication you are prescribed as instructed.  If you develop any further rhythm episodes lasting greater than 4 hours, severe crushing chest pain, or if you have any other concerns, please return to the ER for further care.

## 2024-04-09 ENCOUNTER — OFFICE VISIT (OUTPATIENT)
Dept: UROLOGY | Facility: CLINIC | Age: 34
End: 2024-04-09
Payer: COMMERCIAL

## 2024-04-09 VITALS — TEMPERATURE: 97 F

## 2024-04-09 DIAGNOSIS — N52.8 OTHER MALE ERECTILE DYSFUNCTION: ICD-10-CM

## 2024-04-09 DIAGNOSIS — R39.9 LOWER URINARY TRACT SYMPTOMS (LUTS): ICD-10-CM

## 2024-04-09 DIAGNOSIS — N48.30 PRIAPISM: ICD-10-CM

## 2024-04-09 DIAGNOSIS — F32.A DEPRESSION, UNSPECIFIED DEPRESSION TYPE: ICD-10-CM

## 2024-04-09 PROCEDURE — 99204 OFFICE O/P NEW MOD 45 MIN: CPT | Performed by: UROLOGY

## 2024-04-09 PROCEDURE — 3008F BODY MASS INDEX DOCD: CPT | Performed by: UROLOGY

## 2024-04-09 PROCEDURE — 1036F TOBACCO NON-USER: CPT | Performed by: UROLOGY

## 2024-04-09 RX ORDER — TADALAFIL 5 MG/1
2.5 TABLET ORAL DAILY
Qty: 30 TABLET | Refills: 3 | Status: SHIPPED | OUTPATIENT
Start: 2024-04-09 | End: 2024-05-09

## 2024-04-09 RX ORDER — VENLAFAXINE HYDROCHLORIDE 75 MG/1
75 CAPSULE, EXTENDED RELEASE ORAL DAILY
Qty: 30 CAPSULE | Refills: 1 | Status: SHIPPED | OUTPATIENT
Start: 2024-04-09 | End: 2024-04-18 | Stop reason: SDUPTHER

## 2024-04-09 ASSESSMENT — PAIN SCALES - GENERAL: PAINLEVEL: 2

## 2024-04-09 NOTE — PROGRESS NOTES
HPI  33 y.o. M seen today for complaints of recurrent priapism.    -referred by ED for priapism (many episodes)  -has been ongoing since 2006, has not tried any medication  -treatment usually requires aspiration in ED  -has not been dx with sickle cell, denies famhx of blood related cancers  -refused daily Viagra from previous urologist  -reports erections every morning, differing durations  -denies unexpected weight loss, gross hematuria, recreational drug use  -reports good libido, sex drive   -has taken ntg in the past for angina, does not take regularly       Lab Results   Component Value Date    CREATININE 0.94 03/20/2024     Lab Results   Component Value Date    HCT 41.1 03/20/2024       Current Medications:  Current Outpatient Medications   Medication Sig Dispense Refill    acetaminophen (TylenoL) 325 mg tablet Take 2 tablets (650 mg) by mouth every 6 hours if needed for mild pain (1 - 3) or fever (temp greater than 38.0 C). (Patient not taking: Reported on 4/9/2024) 30 tablet 0    famotidine (Pepcid) 10 mg tablet Take 1 tablet (10 mg) by mouth 2 times a day. (Patient not taking: Reported on 4/9/2024) 60 tablet 5    hydrOXYzine HCL (Atarax) 25 mg tablet Take 1 tablet (25 mg) by mouth every 6 hours if needed.      ibuprofen 600 mg tablet Take 1 tablet (600 mg) by mouth every 8 hours if needed for mild pain (1 - 3) or fever (temp greater than 38.0 C). (Patient not taking: Reported on 4/9/2024) 30 tablet 0    methocarbamol (Robaxin) 500 mg tablet Take 1 tablet (500 mg) by mouth 2 times a day for 10 days. 20 tablet 0    pantoprazole (ProtoNix) 20 mg EC tablet Take 1 tablet (20 mg) by mouth once daily for 20 days. Do not crush, chew, or split. (Patient not taking: Reported on 4/9/2024) 20 tablet 0    SUMAtriptan (Imitrex) 50 mg tablet Take 1 tablet (50 mg) by mouth 1 time if needed for migraine. May repeat after 2 hours. (Patient not taking: Reported on 4/9/2024) 27 tablet 3    venlafaxine XR (Effexor-XR) 75 mg 24  hr capsule Take 1 capsule (75 mg) by mouth once daily. Take with food. (Patient not taking: Reported on 4/9/2024) 30 capsule 1     No current facility-administered medications for this visit.        PMH:  Past Medical History:   Diagnosis Date    Migraine     Priapism, unspecified 03/13/2015    Priapism       PSH:  Past Surgical History:   Procedure Laterality Date    OTHER SURGICAL HISTORY  02/11/2020    No history of surgery       FMH:  No family history on file.    SHx:  Social History     Tobacco Use    Smoking status: Never    Smokeless tobacco: Never   Vaping Use    Vaping Use: Never used   Substance Use Topics    Alcohol use: Never    Drug use: Never       Allergies:  Allergies   Allergen Reactions    Amitriptyline Other     priapism    Amoxicillin Unknown    Citalopram Other     priapism    Pregabalin Other     priapism       Physical Exam   Testicles descended bilaterally, nontender, no masses  Vasa palpable bilaterally  Penis circ'd, no lesions, no plaques    Assessment/Plan  #Recurrent Priapism  -discussed paradoxical effect of PDE5i use for priapism  -discussed penile implants, antiandrogens and other therapies.   -start daily Cialis 2.5mg - med counseling reviewed  -referral to hematology    Scribe Attestation  By signing my name below, I, Gary Paniagua, attest that this documentation  has been prepared under the direction and in the presence of Ro Sanabria MD.

## 2024-04-11 ENCOUNTER — PATIENT OUTREACH (OUTPATIENT)
Dept: HEMATOLOGY/ONCOLOGY | Facility: HOSPITAL | Age: 34
End: 2024-04-11
Payer: COMMERCIAL

## 2024-04-11 DIAGNOSIS — N48.30 PRIAPISM, UNSPECIFIED: ICD-10-CM

## 2024-04-11 NOTE — PROGRESS NOTES
04/11/2024 1643: Patient is referred for recurrent priapism, however was scheduled with Dr. Palacio, confirmed with Dr. Sanabria why patient was being referred. He stated he wanted him to be evaluated for sickle cell or any other potential hematologic etiology. Dr. Hdez reviewed patient chart, staed he found a HGB identification that was normal so no sickle cell, thus order placed for patient to see benign hematology. Called patient and left him a message regarding the above letting him know he would be called to schedule with benign hematologist. My number provided to call back with any questions. Shashi Murillo RN.

## 2024-04-12 NOTE — PROGRESS NOTES
PREOPERATIVE, MULTIDISCIPLINARY, MEDICALLY SUPERVISED, REDUCED CALORIE DIET, BEHAVIOR MODIFICATION AND EXERCISE PROGRAM    S:  MSWL 2/6. Pt did not weigh self prior to appt. Reports practicing 30's rule. Stated he had cut back on alcohol, but back to previous intake due recent loss of his sister in law. Reports eating 3 meals/day more often, but still skipping meals. Fluids have been mainly water with crystal light and propel, recently tried Constantino. Taking MVI daily. No structured exercise, but had been using a fitbit to track daily steps. Recommended to use 1700 alorie meal plan to improve balance of meals and maintain calorie deficit for weight loss.     B- bag of peanuts, x2 bananas   AM snack - peanuts or cashews OR apples with peanut butter   L-  1 cup of pre cut watermelon, water   D - mashed potatoes, great northern beans, corned beef and cabbage     Exercise: physically active job 10 hr 5 days/week, 5-6K steps    O:    Wt: n/a lbs    Ht:   66 in     BMI:  n/a    Goal: 5% body weight loss over the course of program    Dietary recommendation:   1.Continue to drink 64oz water or sugar free fluid daily. Avoid alcohol.   2. Practice the 30-30-30 rule by drinking between meals.  3. Use the 1700 calorie meal plan and food logs. Record what you eat daily.   4. Have balanced meals that always contain a good source of protein.  5. Continue to take a multivitamin daily.  6. Increase your daily step count to 8615-5171 steps/day.       A/P: Pt appears to be making ome progress. Pt has  goal to keep calorie intake to 1700/day and ad oe lean protein to meals. Pt has  goal to avoid alcohol. Pt has a goal too walk 6K steps/day.       Analy Mejias RD

## 2024-04-15 ENCOUNTER — NUTRITION (OUTPATIENT)
Dept: SURGERY | Facility: HOSPITAL | Age: 34
End: 2024-04-15
Payer: COMMERCIAL

## 2024-04-15 DIAGNOSIS — E66.01 CLASS 3 SEVERE OBESITY WITH SERIOUS COMORBIDITY AND BODY MASS INDEX (BMI) OF 60.0 TO 69.9 IN ADULT, UNSPECIFIED OBESITY TYPE (MULTI): Primary | ICD-10-CM

## 2024-04-18 ENCOUNTER — OFFICE VISIT (OUTPATIENT)
Dept: PRIMARY CARE | Facility: HOSPITAL | Age: 34
End: 2024-04-18
Payer: COMMERCIAL

## 2024-04-18 VITALS
SYSTOLIC BLOOD PRESSURE: 127 MMHG | OXYGEN SATURATION: 96 % | HEART RATE: 97 BPM | BODY MASS INDEX: 50.62 KG/M2 | DIASTOLIC BLOOD PRESSURE: 82 MMHG | WEIGHT: 315 LBS | TEMPERATURE: 97.1 F | HEIGHT: 66 IN

## 2024-04-18 DIAGNOSIS — R73.03 PREDIABETES: Primary | ICD-10-CM

## 2024-04-18 DIAGNOSIS — G43.809 OTHER MIGRAINE WITHOUT STATUS MIGRAINOSUS, NOT INTRACTABLE: ICD-10-CM

## 2024-04-18 DIAGNOSIS — F32.A DEPRESSION, UNSPECIFIED DEPRESSION TYPE: ICD-10-CM

## 2024-04-18 DIAGNOSIS — M54.41 ACUTE MIDLINE LOW BACK PAIN WITH RIGHT-SIDED SCIATICA: ICD-10-CM

## 2024-04-18 DIAGNOSIS — M54.2 NECK PAIN: ICD-10-CM

## 2024-04-18 DIAGNOSIS — N48.30 PRIAPISM: ICD-10-CM

## 2024-04-18 DIAGNOSIS — R07.9 CHEST PAIN, UNSPECIFIED TYPE: ICD-10-CM

## 2024-04-18 PROCEDURE — 99214 OFFICE O/P EST MOD 30 MIN: CPT | Performed by: INTERNAL MEDICINE

## 2024-04-18 PROCEDURE — 93010 ELECTROCARDIOGRAM REPORT: CPT | Performed by: INTERNAL MEDICINE

## 2024-04-18 PROCEDURE — 99214 OFFICE O/P EST MOD 30 MIN: CPT | Mod: GC

## 2024-04-18 PROCEDURE — 93005 ELECTROCARDIOGRAM TRACING: CPT

## 2024-04-18 PROCEDURE — 3008F BODY MASS INDEX DOCD: CPT | Performed by: INTERNAL MEDICINE

## 2024-04-18 PROCEDURE — 1036F TOBACCO NON-USER: CPT | Performed by: INTERNAL MEDICINE

## 2024-04-18 RX ORDER — HYDROXYZINE HYDROCHLORIDE 25 MG/1
25 TABLET, FILM COATED ORAL EVERY 6 HOURS PRN
Qty: 30 TABLET | Refills: 2 | Status: SHIPPED | OUTPATIENT
Start: 2024-04-18 | End: 2024-05-06 | Stop reason: SDUPTHER

## 2024-04-18 RX ORDER — SUMATRIPTAN 50 MG/1
50 TABLET, FILM COATED ORAL ONCE AS NEEDED
Qty: 27 TABLET | Refills: 3 | Status: SHIPPED | OUTPATIENT
Start: 2024-04-18 | End: 2025-04-18

## 2024-04-18 RX ORDER — VENLAFAXINE HYDROCHLORIDE 75 MG/1
75 CAPSULE, EXTENDED RELEASE ORAL DAILY
Qty: 30 CAPSULE | Refills: 1 | Status: SHIPPED | OUTPATIENT
Start: 2024-04-18 | End: 2024-05-28 | Stop reason: DRUGHIGH

## 2024-04-18 RX ORDER — PANTOPRAZOLE SODIUM 20 MG/1
40 TABLET, DELAYED RELEASE ORAL DAILY
Qty: 60 TABLET | Refills: 0 | Status: SHIPPED | OUTPATIENT
Start: 2024-04-18 | End: 2024-05-18

## 2024-04-18 RX ORDER — ACETAMINOPHEN 325 MG/1
650 TABLET ORAL EVERY 6 HOURS PRN
Qty: 30 TABLET | Refills: 0 | Status: SHIPPED | OUTPATIENT
Start: 2024-04-18

## 2024-04-18 RX ORDER — METHOCARBAMOL 500 MG/1
500 TABLET, FILM COATED ORAL 2 TIMES DAILY
Qty: 20 TABLET | Refills: 0 | Status: SHIPPED | OUTPATIENT
Start: 2024-04-18 | End: 2024-05-23 | Stop reason: SINTOL

## 2024-04-18 RX ORDER — IBUPROFEN 600 MG/1
600 TABLET ORAL EVERY 8 HOURS PRN
Qty: 30 TABLET | Refills: 0 | Status: SHIPPED | OUTPATIENT
Start: 2024-04-18 | End: 2024-05-23 | Stop reason: WASHOUT

## 2024-04-18 ASSESSMENT — ENCOUNTER SYMPTOMS
DIARRHEA: 0
ABDOMINAL PAIN: 0
LOSS OF SENSATION IN FEET: 0
NAUSEA: 0
DEPRESSION: 1
WEAKNESS: 1
DYSURIA: 0
CHOKING: 0
MYALGIAS: 1
DIFFICULTY URINATING: 0
DYSPHORIC MOOD: 1
NERVOUS/ANXIOUS: 1
TROUBLE SWALLOWING: 0
CONFUSION: 0
OCCASIONAL FEELINGS OF UNSTEADINESS: 0
FEVER: 0
HEADACHES: 1
BLOOD IN STOOL: 0
CHILLS: 0
FATIGUE: 1
SORE THROAT: 0
VOMITING: 0
COUGH: 0
CONSTIPATION: 0

## 2024-04-18 ASSESSMENT — PATIENT HEALTH QUESTIONNAIRE - PHQ9
1. LITTLE INTEREST OR PLEASURE IN DOING THINGS: SEVERAL DAYS
10. IF YOU CHECKED OFF ANY PROBLEMS, HOW DIFFICULT HAVE THESE PROBLEMS MADE IT FOR YOU TO DO YOUR WORK, TAKE CARE OF THINGS AT HOME, OR GET ALONG WITH OTHER PEOPLE: VERY DIFFICULT
2. FEELING DOWN, DEPRESSED OR HOPELESS: SEVERAL DAYS
SUM OF ALL RESPONSES TO PHQ9 QUESTIONS 1 AND 2: 2

## 2024-04-18 ASSESSMENT — PAIN SCALES - GENERAL: PAINLEVEL: 10-WORST PAIN EVER

## 2024-04-18 NOTE — PATIENT INSTRUCTIONS
Dear  Nano,    It was a pleasure to care for you today. As discussed:    Let's restart your venlafaxine 75 mg. Please allow 6 weeks for this medication to take effect.  2. Pantoprazole was increased to 40 mg for heartburn  3. Blood and urine tests were ordered today.  4. Please schedule an appointment with sleep medicine for assistance with CPAP device. An active referral is available.     Please return to clinic in 2 months. Please be seen sooner if you are not able to make an appointment with mental health within 6 weeks.
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
## 2024-04-19 ENCOUNTER — APPOINTMENT (OUTPATIENT)
Dept: HEMATOLOGY/ONCOLOGY | Facility: HOSPITAL | Age: 34
End: 2024-04-19
Payer: COMMERCIAL

## 2024-04-23 NOTE — PROGRESS NOTES
I saw and evaluated the patient. I personally obtained the key and critical portions of the history and physical exam or was physically present for key and critical portions performed by the resident/fellow. I reviewed the resident/fellow's documentation and discussed the patient with the resident/fellow. I agree with the resident/fellow's medical decision making as documented in the note.    Jesse Ross MD MPH

## 2024-04-26 ENCOUNTER — APPOINTMENT (OUTPATIENT)
Dept: HEMATOLOGY/ONCOLOGY | Facility: CLINIC | Age: 34
End: 2024-04-26
Payer: COMMERCIAL

## 2024-04-29 ENCOUNTER — CLINICAL SUPPORT (OUTPATIENT)
Dept: EMERGENCY MEDICINE | Facility: HOSPITAL | Age: 34
End: 2024-04-29
Payer: COMMERCIAL

## 2024-04-29 ENCOUNTER — HOSPITAL ENCOUNTER (EMERGENCY)
Facility: HOSPITAL | Age: 34
Discharge: LEFT BEFORE DISCHARGE | End: 2024-04-29
Attending: EMERGENCY MEDICINE
Payer: COMMERCIAL

## 2024-04-29 VITALS
SYSTOLIC BLOOD PRESSURE: 108 MMHG | HEIGHT: 66 IN | OXYGEN SATURATION: 97 % | HEART RATE: 80 BPM | TEMPERATURE: 97 F | BODY MASS INDEX: 50.62 KG/M2 | WEIGHT: 315 LBS | DIASTOLIC BLOOD PRESSURE: 66 MMHG | RESPIRATION RATE: 20 BRPM

## 2024-04-29 DIAGNOSIS — M25.511 RIGHT SHOULDER PAIN, UNSPECIFIED CHRONICITY: Primary | ICD-10-CM

## 2024-04-29 LAB
ATRIAL RATE: 70 BPM
P AXIS: 30 DEGREES
P OFFSET: 176 MS
P ONSET: 114 MS
PR INTERVAL: 196 MS
Q ONSET: 212 MS
QRS COUNT: 12 BEATS
QRS DURATION: 80 MS
QT INTERVAL: 384 MS
QTC CALCULATION(BAZETT): 414 MS
QTC FREDERICIA: 404 MS
R AXIS: 50 DEGREES
T AXIS: 68 DEGREES
T OFFSET: 404 MS
VENTRICULAR RATE: 70 BPM

## 2024-04-29 PROCEDURE — 96372 THER/PROPH/DIAG INJ SC/IM: CPT

## 2024-04-29 PROCEDURE — 2500000004 HC RX 250 GENERAL PHARMACY W/ HCPCS (ALT 636 FOR OP/ED): Mod: SE

## 2024-04-29 PROCEDURE — 99284 EMERGENCY DEPT VISIT MOD MDM: CPT | Performed by: EMERGENCY MEDICINE

## 2024-04-29 PROCEDURE — 93005 ELECTROCARDIOGRAM TRACING: CPT

## 2024-04-29 PROCEDURE — 93010 ELECTROCARDIOGRAM REPORT: CPT | Performed by: PHYSICIAN ASSISTANT

## 2024-04-29 PROCEDURE — 99283 EMERGENCY DEPT VISIT LOW MDM: CPT

## 2024-04-29 RX ORDER — KETOROLAC TROMETHAMINE 15 MG/ML
15 INJECTION, SOLUTION INTRAMUSCULAR; INTRAVENOUS ONCE
Status: COMPLETED | OUTPATIENT
Start: 2024-04-29 | End: 2024-04-29

## 2024-04-29 RX ORDER — ORPHENADRINE CITRATE 30 MG/ML
60 INJECTION INTRAMUSCULAR; INTRAVENOUS ONCE
Status: COMPLETED | OUTPATIENT
Start: 2024-04-29 | End: 2024-04-29

## 2024-04-29 RX ADMIN — KETOROLAC TROMETHAMINE 15 MG: 15 INJECTION, SOLUTION INTRAMUSCULAR; INTRAVENOUS at 03:50

## 2024-04-29 RX ADMIN — ORPHENADRINE CITRATE 60 MG: 60 INJECTION INTRAMUSCULAR; INTRAVENOUS at 03:50

## 2024-04-29 ASSESSMENT — COLUMBIA-SUICIDE SEVERITY RATING SCALE - C-SSRS
1. IN THE PAST MONTH, HAVE YOU WISHED YOU WERE DEAD OR WISHED YOU COULD GO TO SLEEP AND NOT WAKE UP?: NO
6. HAVE YOU EVER DONE ANYTHING, STARTED TO DO ANYTHING, OR PREPARED TO DO ANYTHING TO END YOUR LIFE?: NO
2. HAVE YOU ACTUALLY HAD ANY THOUGHTS OF KILLING YOURSELF?: NO

## 2024-04-29 NOTE — ED TRIAGE NOTES
Patient reports back and stomach pain x 4 days. Denies nausea, vomiting. States the pain is sharp with movements. Chest pain that he states never goes away. Pmhx angina, fibromyalgia.

## 2024-04-29 NOTE — ED PROVIDER NOTES
EMERGENCY DEPARTMENT ENCOUNTER      Pt Name: Luis cMgill  MRN: [id number]  Birthdate 1990  Date of evaluation: 4/29/2024  Provider: Juan Ramos DO    CHIEF COMPLAINT       Chief Complaint   Patient presents with    Back Pain         HISTORY OF PRESENT ILLNESS    Patient is a 33-year-old male past medical history significant for previous him that is required drainage multiple times, obesity, NICOLE on CPAP, hidradenitis suppurativa, GERD presenting with a chief complaint of back pain localized  near the right scapula.  Described as a sharp sensation.  States that it is worse when he takes a deep breath in, worse with movement of the arm.  Otherwise denies any abdominal pain or nausea.  Has not been throwing up.  No shortness of breath.  No cough.  No lightheadedness or dizziness.          Nursing Notes were reviewed.    PAST MEDICAL HISTORY     Past Medical History:   Diagnosis Date    Migraine     Priapism, unspecified 03/13/2015    Priapism         SURGICAL HISTORY       Past Surgical History:   Procedure Laterality Date    OTHER SURGICAL HISTORY  02/11/2020    No history of surgery         CURRENT MEDICATIONS       Discharge Medication List as of 4/29/2024  6:34 AM        CONTINUE these medications which have NOT CHANGED    Details   acetaminophen (TylenoL) 325 mg tablet Take 2 tablets (650 mg) by mouth every 6 hours if needed for mild pain (1 - 3) or fever (temp greater than 38.0 C)., Starting Thu 4/18/2024, Normal      hydrOXYzine HCL (Atarax) 25 mg tablet Take 1 tablet (25 mg) by mouth every 6 hours if needed for anxiety., Starting Thu 4/18/2024, Normal      ibuprofen 600 mg tablet Take 1 tablet (600 mg) by mouth every 8 hours if needed for mild pain (1 - 3) or fever (temp greater than 38.0 C)., Starting u 4/18/2024, Normal      methocarbamol (Robaxin) 500 mg tablet Take 1 tablet (500 mg) by mouth 2 times a day for 10 days., Starting u 4/18/2024, Until Sun 4/28/2024, Normal      pantoprazole  (ProtoNix) 20 mg EC tablet Take 2 tablets (40 mg) by mouth once daily. Do not crush, chew, or split., Starting Thu 4/18/2024, Until Sat 5/18/2024, Normal      SUMAtriptan (Imitrex) 50 mg tablet Take 1 tablet (50 mg) by mouth 1 time if needed for migraine. May repeat after 2 hours., Starting Thu 4/18/2024, Until Fri 4/18/2025 at 2359, Normal      tadalafil (Cialis) 5 mg tablet Take 0.5 tablets (2.5 mg) by mouth once daily., Starting Tue 4/9/2024, Until Thu 5/9/2024, Normal      venlafaxine XR (Effexor-XR) 75 mg 24 hr capsule Take 1 capsule (75 mg) by mouth once daily. Take with food., Starting Thu 4/18/2024, Until Mon 6/17/2024, Normal             ALLERGIES     Amitriptyline, Amoxicillin, Citalopram, and Pregabalin    FAMILY HISTORY     No family history on file.       SOCIAL HISTORY       Social History     Socioeconomic History    Marital status: Single     Spouse name: Not on file    Number of children: Not on file    Years of education: Not on file    Highest education level: Not on file   Occupational History    Not on file   Tobacco Use    Smoking status: Never    Smokeless tobacco: Never   Vaping Use    Vaping status: Never Used   Substance and Sexual Activity    Alcohol use: Yes    Drug use: Never    Sexual activity: Not on file   Other Topics Concern    Not on file   Social History Narrative    Not on file     Social Determinants of Health     Financial Resource Strain: Unknown (11/21/2022)    Received from [facility]    Overall Financial Resource Strain (CARDIA)     Difficulty of Paying Living Expenses: Patient declined   Food Insecurity: Unknown (3/5/2024)    Received from [facility]    Hunger Vital Sign     Worried About Running Out of Food in the Last Year: Never true     Ran Out of Food in the Last Year: Not on file   Transportation Needs: Unknown (11/21/2022)    Received from [facility]    PRAPARE - Transportation     Lack of Transportation (Medical): Patient declined     Lack of  Transportation (Non-Medical): Patient declined   Physical Activity: Unknown (11/21/2022)    Received from [facility]    Exercise Vital Sign     Days of Exercise per Week: Patient declined     Minutes of Exercise per Session: Patient declined   Stress: Stress Concern Present (12/22/2021)    Received from [facility]    [employer] - Occupational Stress Questionnaire     Feeling of Stress : Very much   Social Connections: Unknown (11/21/2022)    Received from [facility]    Social Connection and Isolation Panel [NHANES]     Frequency of Communication with Friends and Family: Patient declined     Frequency of Social Gatherings with Friends and Family: Patient declined     Attends Alevism Services: Patient declined     Active Member of Clubs or Organizations: Patient declined     Attends Club or Organization Meetings: Patient declined     Marital Status: Patient declined   Intimate Partner Violence: Not on file   Housing Stability: Unknown (11/21/2022)    Received from [facility]    Housing Stability Vital Sign     Unable to Pay for Housing in the Last Year: Patient refused     Number of Places Lived in the Last Year: Not on file     Unstable Housing in the Last Year: Patient refused       SCREENINGS                        PHYSICAL EXAM    (up to 7 for level 4, 8 or more for level 5)     ED Triage Vitals [04/29/24 0301]   Temperature Heart Rate Respirations BP   36.1 °C (97 °F) 80 20 108/66      Pulse Ox Temp src Heart Rate Source Patient Position   97 % -- -- --      BP Location FiO2 (%)     -- --       Physical Exam  Vitals and nursing note reviewed.   Constitutional:       General: He is not in acute distress.     Appearance: Normal appearance. He is obese. He is not ill-appearing or toxic-appearing.   HENT:      Head: Normocephalic and atraumatic.      Right Ear: External ear normal.      Left Ear: External ear normal.      Nose: Nose normal.   Eyes:      General:          Right eye: No discharge.         Left eye: No discharge.      Extraocular Movements: Extraocular movements intact.      Conjunctiva/sclera: Conjunctivae normal.      Pupils: Pupils are equal, round, and reactive to light.   Cardiovascular:      Rate and Rhythm: Normal rate and regular rhythm.      Pulses: Normal pulses.      Heart sounds: Normal heart sounds.   Pulmonary:      Effort: Pulmonary effort is normal.      Breath sounds: Normal breath sounds.   Abdominal:      General: There is no distension.      Palpations: Abdomen is soft. There is no mass.      Tenderness: There is no abdominal tenderness. There is no guarding.   Musculoskeletal:         General: No deformity or signs of injury.   Skin:     General: Skin is warm and dry.   Neurological:      General: No focal deficit present.      Mental Status: He is alert and oriented to person, place, and time. Mental status is at baseline.   Psychiatric:         Mood and Affect: Mood normal.         Behavior: Behavior normal.          DIAGNOSTIC RESULTS     LABS:  Labs Reviewed - No data to display    All other labs were within normal range or not returned as of this dictation.    Imaging  No orders to display        Procedures  Procedures     EMERGENCY DEPARTMENT COURSE/MDM:   Medical Decision Making  33-year-old male presents complaint of back pain, mostly localized to the right shoulder described as a sharp sensation underneath the scapula.  This is worse when he takes a deep breath and and also is with any movement.  Patient states he has a history of fibromyalgia and has overall constant pain, but feels this is somewhat different.  Has not found any relief with medications at home.  Patient otherwise does not have any other risk factors for coronary disease.  Symptomatic management was offered in the ER with Toradol and Norflex.  Patient was in agreement with this plan.  He was reevaluated after he received these, states that he had some improvement  of his pain but it did not completely go away.        Please see ED course for additional MDM.    ED Course as of 04/29/24 0723 Mon Apr 29, 2024   0653 Patient ultimately left without treatment complete. [CL]      ED Course User Index  [CL] Juan Ramos DO         Diagnoses as of 04/29/24 0723   Right shoulder pain, unspecified chronicity        Patient and or family in agreement and understanding of treatment plan.  All questions answered.      I reviewed the case with the attending ED physician. The attending ED physician agrees with the plan. Patient and/or patient´s representative was counseled regarding labs, imaging, likely diagnosis, and plan. All questions were answered.    ED Medications administered this visit:    Medications   orphenadrine (Norflex) injection 60 mg (60 mg intramuscular Given 4/29/24 0350)   ketorolac (Toradol) injection 15 mg (15 mg intramuscular Given 4/29/24 0350)       New Prescriptions from this visit:    Discharge Medication List as of 4/29/2024  6:34 AM          Follow-up:  No follow-up provider specified.      Final Impression:   1. Right shoulder pain, unspecified chronicity          (Please note that portions of this note were completed with a voice recognition program.  Efforts were made to edit the dictations but occasionally words are mis-transcribed.)     Juan Ramos DO  Resident  04/29/24 0653       Juan Ramos DO  Resident  04/29/24 0723

## 2024-05-06 ENCOUNTER — OFFICE VISIT (OUTPATIENT)
Dept: PRIMARY CARE | Facility: HOSPITAL | Age: 34
End: 2024-05-06
Payer: COMMERCIAL

## 2024-05-06 VITALS
TEMPERATURE: 98.7 F | OXYGEN SATURATION: 95 % | WEIGHT: 315 LBS | HEIGHT: 66 IN | DIASTOLIC BLOOD PRESSURE: 77 MMHG | BODY MASS INDEX: 50.62 KG/M2 | HEART RATE: 84 BPM | SYSTOLIC BLOOD PRESSURE: 122 MMHG

## 2024-05-06 DIAGNOSIS — M79.7 FIBROMYALGIA: ICD-10-CM

## 2024-05-06 DIAGNOSIS — R73.03 PREDIABETES: ICD-10-CM

## 2024-05-06 DIAGNOSIS — F32.A DEPRESSION, UNSPECIFIED DEPRESSION TYPE: ICD-10-CM

## 2024-05-06 DIAGNOSIS — G43.809 OTHER MIGRAINE WITHOUT STATUS MIGRAINOSUS, NOT INTRACTABLE: ICD-10-CM

## 2024-05-06 DIAGNOSIS — F32.1 CURRENT MODERATE EPISODE OF MAJOR DEPRESSIVE DISORDER, UNSPECIFIED WHETHER RECURRENT (MULTI): Primary | ICD-10-CM

## 2024-05-06 DIAGNOSIS — M54.41 ACUTE MIDLINE LOW BACK PAIN WITH RIGHT-SIDED SCIATICA: ICD-10-CM

## 2024-05-06 PROCEDURE — 3008F BODY MASS INDEX DOCD: CPT

## 2024-05-06 PROCEDURE — 99214 OFFICE O/P EST MOD 30 MIN: CPT

## 2024-05-06 PROCEDURE — 1036F TOBACCO NON-USER: CPT

## 2024-05-06 PROCEDURE — 99214 OFFICE O/P EST MOD 30 MIN: CPT | Mod: GC

## 2024-05-06 RX ORDER — HYDROXYZINE HYDROCHLORIDE 25 MG/1
50 TABLET, FILM COATED ORAL EVERY 6 HOURS PRN
Start: 2024-05-06

## 2024-05-06 ASSESSMENT — PAIN SCALES - GENERAL: PAINLEVEL: 10-WORST PAIN EVER

## 2024-05-06 ASSESSMENT — ENCOUNTER SYMPTOMS
OCCASIONAL FEELINGS OF UNSTEADINESS: 0
LOSS OF SENSATION IN FEET: 0
DEPRESSION: 0
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
## 2024-05-06 NOTE — PATIENT INSTRUCTIONS
As discussed today, our plan is:     Labs - we collected labs today and will call you with any abnormal results. If you have any questions or concerns prior to us calling feel free to call our office to have your questions addressed.   Medication changes: Restarted all of your home medications.   Consultations - you were referred to see the following specialists: Behavioral health (We will call to make an appointment) and pain management  4.   Please call [phone] for questions regarding scheduling your appointment.       Please come back to see me in: 1 month  ------  If you have any problems or questions, please contact the clinic at [phone] to leave a message. Our fax number is [phone]. If your issue cannot wait until the next business day, please go to urgent care or the emergency department.     I also strongly urge all of my patients to register for [facility] by going to: [email or web address]  (The  staff can also send you a text/email link to register when you check out).    No shows: It is understandable if you are unable to make it to a visit, but please cancel your appointment instead of not showing up. This helps to give other patients access to primary care and keeps wait times low.        [facility]   [phone]

## 2024-05-07 ENCOUNTER — DOCUMENTATION (OUTPATIENT)
Dept: BEHAVIORAL HEALTH | Facility: CLINIC | Age: 34
End: 2024-05-07
Payer: COMMERCIAL

## 2024-05-07 ENCOUNTER — DOCUMENTATION (OUTPATIENT)
Dept: PRIMARY CARE | Facility: HOSPITAL | Age: 34
End: 2024-05-07
Payer: COMMERCIAL

## 2024-05-07 NOTE — PROGRESS NOTES
"Luis Mcgill is a 33 y.o. male who presented to the [facility] on May 6, 2024 to have paperwork completed for GV assistance. Upon request from the doctor, the LISW met with Luis due to expressing symptoms of depression. The LISW conducted the C-SSRS in which Luis scored \"low risk\". He does have access to firearms but they are in a locked safe. He denies ever having a plan to commit suicide and vocalized getting very sad at times, struggling to leave his home, avoiding the outside world and \"judgmental people\". Luis would like resources that will help him provide better supports for his three daughters. The LISW sent Luis an email on May 7, 2024 with a list of different resources that Luis can utilize to provide supports to his daughter, ranging from clothing, after school care, school supplies, and energy assistance programs. The doctor placed a referral for Luis to see  behavioral health services.       "

## 2024-05-16 ENCOUNTER — APPOINTMENT (OUTPATIENT)
Dept: PRIMARY CARE | Facility: HOSPITAL | Age: 34
End: 2024-05-16
Payer: COMMERCIAL

## 2024-05-20 ENCOUNTER — APPOINTMENT (OUTPATIENT)
Dept: HEMATOLOGY/ONCOLOGY | Facility: CLINIC | Age: 34
End: 2024-05-20
Payer: COMMERCIAL

## 2024-05-23 ENCOUNTER — LAB (OUTPATIENT)
Dept: LAB | Facility: LAB | Age: 34
End: 2024-05-23
Payer: COMMERCIAL

## 2024-05-23 ENCOUNTER — OFFICE VISIT (OUTPATIENT)
Dept: PRIMARY CARE | Facility: HOSPITAL | Age: 34
End: 2024-05-23
Payer: COMMERCIAL

## 2024-05-23 VITALS
HEIGHT: 66 IN | OXYGEN SATURATION: 96 % | HEART RATE: 92 BPM | DIASTOLIC BLOOD PRESSURE: 86 MMHG | SYSTOLIC BLOOD PRESSURE: 135 MMHG | WEIGHT: 315 LBS | BODY MASS INDEX: 50.62 KG/M2 | TEMPERATURE: 98.2 F

## 2024-05-23 DIAGNOSIS — Z98.84 BARIATRIC SURGERY STATUS: ICD-10-CM

## 2024-05-23 DIAGNOSIS — E66.01 MORBID OBESITY (MULTI): ICD-10-CM

## 2024-05-23 DIAGNOSIS — G43.809 OTHER MIGRAINE WITHOUT STATUS MIGRAINOSUS, NOT INTRACTABLE: ICD-10-CM

## 2024-05-23 DIAGNOSIS — E66.8 EXTREME OBESITY: ICD-10-CM

## 2024-05-23 DIAGNOSIS — M79.7 FIBROMYALGIA: Primary | ICD-10-CM

## 2024-05-23 DIAGNOSIS — E88.810 METABOLIC SYNDROME: ICD-10-CM

## 2024-05-23 DIAGNOSIS — Z13.21 ENCOUNTER FOR VITAMIN DEFICIENCY SCREENING: ICD-10-CM

## 2024-05-23 DIAGNOSIS — Z01.818 PREOPERATIVE CLEARANCE: ICD-10-CM

## 2024-05-23 DIAGNOSIS — R73.03 PREDIABETES: ICD-10-CM

## 2024-05-23 DIAGNOSIS — F32.A DEPRESSION, UNSPECIFIED DEPRESSION TYPE: ICD-10-CM

## 2024-05-23 DIAGNOSIS — M54.41 ACUTE MIDLINE LOW BACK PAIN WITH RIGHT-SIDED SCIATICA: ICD-10-CM

## 2024-05-23 LAB
25(OH)D3 SERPL-MCNC: 15 NG/ML (ref 30–100)
ALBUMIN SERPL BCP-MCNC: 4.1 G/DL (ref 3.4–5)
ALP SERPL-CCNC: 51 U/L (ref 33–120)
ALT SERPL W P-5'-P-CCNC: 21 U/L (ref 10–52)
ANION GAP SERPL CALC-SCNC: 15 MMOL/L (ref 10–20)
APPEARANCE UR: CLEAR
APTT PPP: 30 SECONDS (ref 27–38)
AST SERPL W P-5'-P-CCNC: 22 U/L (ref 9–39)
BASOPHILS # BLD AUTO: 0.06 X10*3/UL (ref 0–0.1)
BASOPHILS NFR BLD AUTO: 0.7 %
BILIRUB SERPL-MCNC: 0.4 MG/DL (ref 0–1.2)
BILIRUB UR STRIP.AUTO-MCNC: NEGATIVE MG/DL
BUN SERPL-MCNC: 13 MG/DL (ref 6–23)
C PEPTIDE SERPL-MCNC: 4 NG/ML (ref 0.7–3.9)
CALCIUM SERPL-MCNC: 9.4 MG/DL (ref 8.6–10.6)
CAOX CRY #/AREA UR COMP ASSIST: ABNORMAL /HPF
CHLORIDE SERPL-SCNC: 100 MMOL/L (ref 98–107)
CHOLEST SERPL-MCNC: 180 MG/DL (ref 0–199)
CHOLEST SERPL-MCNC: 184 MG/DL (ref 0–199)
CHOLESTEROL/HDL RATIO: 3.1
CHOLESTEROL/HDL RATIO: 3.2
CO2 SERPL-SCNC: 28 MMOL/L (ref 21–32)
COLOR UR: NORMAL
CREAT SERPL-MCNC: 0.87 MG/DL (ref 0.5–1.3)
EGFRCR SERPLBLD CKD-EPI 2021: >90 ML/MIN/1.73M*2
EOSINOPHIL # BLD AUTO: 0.35 X10*3/UL (ref 0–0.7)
EOSINOPHIL NFR BLD AUTO: 4.3 %
ERYTHROCYTE [DISTWIDTH] IN BLOOD BY AUTOMATED COUNT: 16 % (ref 11.5–14.5)
EST. AVERAGE GLUCOSE BLD GHB EST-MCNC: 128 MG/DL
FERRITIN SERPL-MCNC: 152 NG/ML (ref 20–300)
FOLATE SERPL-MCNC: 8.9 NG/ML
GLUCOSE SERPL-MCNC: 92 MG/DL (ref 74–99)
GLUCOSE UR STRIP.AUTO-MCNC: NORMAL MG/DL
HBA1C MFR BLD: 6.1 %
HCT VFR BLD AUTO: 43 % (ref 41–52)
HDLC SERPL-MCNC: 57.2 MG/DL
HDLC SERPL-MCNC: 57.8 MG/DL
HGB BLD-MCNC: 13.7 G/DL (ref 13.5–17.5)
IMM GRANULOCYTES # BLD AUTO: 0.02 X10*3/UL (ref 0–0.7)
IMM GRANULOCYTES NFR BLD AUTO: 0.2 % (ref 0–0.9)
INR PPP: 1 (ref 0.9–1.1)
IRON SATN MFR SERPL: 18 % (ref 25–45)
IRON SERPL-MCNC: 61 UG/DL (ref 35–150)
KETONES UR STRIP.AUTO-MCNC: NEGATIVE MG/DL
LDLC SERPL CALC-MCNC: 107 MG/DL
LDLC SERPL CALC-MCNC: 111 MG/DL
LEUKOCYTE ESTERASE UR QL STRIP.AUTO: NEGATIVE
LYMPHOCYTES # BLD AUTO: 2.68 X10*3/UL (ref 1.2–4.8)
LYMPHOCYTES NFR BLD AUTO: 33.2 %
MCH RBC QN AUTO: 24.6 PG (ref 26–34)
MCHC RBC AUTO-ENTMCNC: 31.9 G/DL (ref 32–36)
MCV RBC AUTO: 77 FL (ref 80–100)
MONOCYTES # BLD AUTO: 0.45 X10*3/UL (ref 0.1–1)
MONOCYTES NFR BLD AUTO: 5.6 %
MUCOUS THREADS #/AREA URNS AUTO: ABNORMAL /LPF
NEUTROPHILS # BLD AUTO: 4.52 X10*3/UL (ref 1.2–7.7)
NEUTROPHILS NFR BLD AUTO: 56 %
NITRITE UR QL STRIP.AUTO: NEGATIVE
NON HDL CHOLESTEROL: 122 MG/DL (ref 0–149)
NON HDL CHOLESTEROL: 127 MG/DL (ref 0–149)
NRBC BLD-RTO: 0 /100 WBCS (ref 0–0)
PH UR STRIP.AUTO: 6 [PH]
PLATELET # BLD AUTO: 365 X10*3/UL (ref 150–450)
POTASSIUM SERPL-SCNC: 4.5 MMOL/L (ref 3.5–5.3)
PROT SERPL-MCNC: 7.9 G/DL (ref 6.4–8.2)
PROT UR STRIP.AUTO-MCNC: NORMAL MG/DL
PROTHROMBIN TIME: 11.5 SECONDS (ref 9.8–12.8)
PTH-INTACT SERPL-MCNC: 45.5 PG/ML (ref 18.5–88)
RBC # BLD AUTO: 5.56 X10*6/UL (ref 4.5–5.9)
RBC # UR STRIP.AUTO: NEGATIVE /UL
RBC #/AREA URNS AUTO: ABNORMAL /HPF
SODIUM SERPL-SCNC: 138 MMOL/L (ref 136–145)
SP GR UR STRIP.AUTO: 1.02
SQUAMOUS #/AREA URNS AUTO: ABNORMAL /HPF
T4 FREE SERPL-MCNC: 1.24 NG/DL (ref 0.78–1.48)
TIBC SERPL-MCNC: 348 UG/DL (ref 240–445)
TRIGL SERPL-MCNC: 78 MG/DL (ref 0–149)
TRIGL SERPL-MCNC: 78 MG/DL (ref 0–149)
TSH SERPL-ACNC: 0.82 MIU/L (ref 0.44–3.98)
UIBC SERPL-MCNC: 287 UG/DL (ref 110–370)
UROBILINOGEN UR STRIP.AUTO-MCNC: NORMAL MG/DL
VIT B12 SERPL-MCNC: 378 PG/ML (ref 211–911)
VLDL: 16 MG/DL (ref 0–40)
VLDL: 16 MG/DL (ref 0–40)
WBC # BLD AUTO: 8.1 X10*3/UL (ref 4.4–11.3)
WBC #/AREA URNS AUTO: ABNORMAL /HPF

## 2024-05-23 PROCEDURE — 83013 H PYLORI (C-13) BREATH: CPT

## 2024-05-23 PROCEDURE — 83550 IRON BINDING TEST: CPT

## 2024-05-23 PROCEDURE — 84681 ASSAY OF C-PEPTIDE: CPT

## 2024-05-23 PROCEDURE — 99215 OFFICE O/P EST HI 40 MIN: CPT | Mod: GC | Performed by: STUDENT IN AN ORGANIZED HEALTH CARE EDUCATION/TRAINING PROGRAM

## 2024-05-23 PROCEDURE — 82525 ASSAY OF COPPER: CPT

## 2024-05-23 PROCEDURE — 84156 ASSAY OF PROTEIN URINE: CPT

## 2024-05-23 PROCEDURE — 80053 COMPREHEN METABOLIC PANEL: CPT

## 2024-05-23 PROCEDURE — 80061 LIPID PANEL: CPT

## 2024-05-23 PROCEDURE — 82306 VITAMIN D 25 HYDROXY: CPT

## 2024-05-23 PROCEDURE — 85025 COMPLETE CBC W/AUTO DIFF WBC: CPT

## 2024-05-23 PROCEDURE — 1036F TOBACCO NON-USER: CPT | Performed by: STUDENT IN AN ORGANIZED HEALTH CARE EDUCATION/TRAINING PROGRAM

## 2024-05-23 PROCEDURE — 84425 ASSAY OF VITAMIN B-1: CPT

## 2024-05-23 PROCEDURE — 83970 ASSAY OF PARATHORMONE: CPT

## 2024-05-23 PROCEDURE — 81001 URINALYSIS AUTO W/SCOPE: CPT

## 2024-05-23 PROCEDURE — 84630 ASSAY OF ZINC: CPT

## 2024-05-23 PROCEDURE — 82607 VITAMIN B-12: CPT

## 2024-05-23 PROCEDURE — 82728 ASSAY OF FERRITIN: CPT

## 2024-05-23 PROCEDURE — 82043 UR ALBUMIN QUANTITATIVE: CPT

## 2024-05-23 PROCEDURE — 36415 COLL VENOUS BLD VENIPUNCTURE: CPT

## 2024-05-23 PROCEDURE — 80323 ALKALOIDS NOS: CPT

## 2024-05-23 PROCEDURE — 80307 DRUG TEST PRSMV CHEM ANLYZR: CPT

## 2024-05-23 PROCEDURE — 85730 THROMBOPLASTIN TIME PARTIAL: CPT

## 2024-05-23 PROCEDURE — 84590 ASSAY OF VITAMIN A: CPT

## 2024-05-23 PROCEDURE — 84439 ASSAY OF FREE THYROXINE: CPT

## 2024-05-23 PROCEDURE — 83540 ASSAY OF IRON: CPT

## 2024-05-23 PROCEDURE — 3008F BODY MASS INDEX DOCD: CPT | Performed by: STUDENT IN AN ORGANIZED HEALTH CARE EDUCATION/TRAINING PROGRAM

## 2024-05-23 PROCEDURE — 99215 OFFICE O/P EST HI 40 MIN: CPT | Performed by: STUDENT IN AN ORGANIZED HEALTH CARE EDUCATION/TRAINING PROGRAM

## 2024-05-23 PROCEDURE — 83036 HEMOGLOBIN GLYCOSYLATED A1C: CPT

## 2024-05-23 PROCEDURE — 84443 ASSAY THYROID STIM HORMONE: CPT

## 2024-05-23 PROCEDURE — 85610 PROTHROMBIN TIME: CPT

## 2024-05-23 PROCEDURE — 82570 ASSAY OF URINE CREATININE: CPT

## 2024-05-23 PROCEDURE — 82746 ASSAY OF FOLIC ACID SERUM: CPT

## 2024-05-23 RX ORDER — DULAGLUTIDE 0.75 MG/.5ML
0.75 INJECTION, SOLUTION SUBCUTANEOUS
Qty: 2 ML | Refills: 11 | Status: SHIPPED | OUTPATIENT
Start: 2024-05-26

## 2024-05-23 RX ORDER — CAPSAICIN 0.03 G/100G
CREAM TOPICAL 2 TIMES DAILY
Qty: 56.6 G | Refills: 11 | Status: SHIPPED | OUTPATIENT
Start: 2024-05-23 | End: 2025-05-23

## 2024-05-23 ASSESSMENT — COLUMBIA-SUICIDE SEVERITY RATING SCALE - C-SSRS
6. HAVE YOU EVER DONE ANYTHING, STARTED TO DO ANYTHING, OR PREPARED TO DO ANYTHING TO END YOUR LIFE?: NO
1. IN THE PAST MONTH, HAVE YOU WISHED YOU WERE DEAD OR WISHED YOU COULD GO TO SLEEP AND NOT WAKE UP?: NO
2. HAVE YOU ACTUALLY HAD ANY THOUGHTS OF KILLING YOURSELF?: NO

## 2024-05-23 ASSESSMENT — PATIENT HEALTH QUESTIONNAIRE - PHQ9
SUM OF ALL RESPONSES TO PHQ9 QUESTIONS 1 AND 2: 0
1. LITTLE INTEREST OR PLEASURE IN DOING THINGS: NOT AT ALL
2. FEELING DOWN, DEPRESSED OR HOPELESS: NOT AT ALL

## 2024-05-23 ASSESSMENT — ENCOUNTER SYMPTOMS
LOSS OF SENSATION IN FEET: 0
OCCASIONAL FEELINGS OF UNSTEADINESS: 0
DEPRESSION: 1

## 2024-05-23 ASSESSMENT — PAIN SCALES - GENERAL: PAINLEVEL: 7
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
## 2024-05-23 NOTE — PATIENT INSTRUCTIONS
Eusebio Mr. Mcgill,    It was a pleasure seeing you again! Today we talked about several important aspects of your health.    1) For your fibromyalgia pain, I am ordering capsaicin cream which you can apply to painful areas twice per day. I also would like you to restart physical therapy as exercise will likely help this pain. I am also referring you to pain medicine. I am also referring you to [facility]    2) Please reschedule your appointment with hematology to discuss how best to treat your priapism.    3) Please be sure to go to your mental health appointment on 05/28/2024, they will be able to continue to help you. Please continue your venlafaxine and hydroxyzine.     4) I am prescribing Trulicity to help with your weight loss and pre-diabetes.     Please return to clinic in 3 months  All the best,   Dr. López

## 2024-05-24 LAB
AMPHETAMINES UR QL SCN: NORMAL
BARBITURATES UR QL SCN: NORMAL
BENZODIAZ UR QL SCN: NORMAL
BZE UR QL SCN: NORMAL
CANNABINOIDS UR QL SCN: NORMAL
CREAT UR-MCNC: 198.1 MG/DL (ref 20–370)
CREAT UR-MCNC: 198.1 MG/DL (ref 20–370)
FENTANYL+NORFENTANYL UR QL SCN: NORMAL
METHADONE UR QL SCN: NORMAL
MICROALBUMIN UR-MCNC: 46.7 MG/L
MICROALBUMIN/CREAT UR: 23.6 UG/MG CREAT
OPIATES UR QL SCN: NORMAL
OXYCODONE+OXYMORPHONE UR QL SCN: NORMAL
PCP UR QL SCN: NORMAL
PROT UR-ACNC: 15 MG/DL (ref 5–25)
PROT/CREAT UR: 0.08 MG/MG CREAT (ref 0–0.17)
UREA BREATH TEST QL: NEGATIVE

## 2024-05-26 LAB
COPPER SERPL-MCNC: 113.6 UG/DL (ref 70–140)
COTININE SERPL-MCNC: <5 NG/ML
NICOTINE SERPL-MCNC: <5 NG/ML
ZINC SERPL-MCNC: 76.7 UG/DL (ref 60–120)

## 2024-05-27 LAB
ANNOTATION COMMENT IMP: NORMAL
RETINYL PALMITATE SERPL-MCNC: 0.03 MG/L (ref 0–0.1)
VIT A SERPL-MCNC: 0.55 MG/L (ref 0.3–1.2)

## 2024-05-28 ENCOUNTER — OFFICE VISIT (OUTPATIENT)
Dept: BEHAVIORAL HEALTH | Facility: CLINIC | Age: 34
End: 2024-05-28
Payer: COMMERCIAL

## 2024-05-28 DIAGNOSIS — F32.A DEPRESSION, UNSPECIFIED DEPRESSION TYPE: ICD-10-CM

## 2024-05-28 PROCEDURE — 3008F BODY MASS INDEX DOCD: CPT | Performed by: STUDENT IN AN ORGANIZED HEALTH CARE EDUCATION/TRAINING PROGRAM

## 2024-05-28 RX ORDER — VENLAFAXINE HYDROCHLORIDE 150 MG/1
150 CAPSULE, EXTENDED RELEASE ORAL DAILY
Qty: 30 CAPSULE | Refills: 11 | Status: SHIPPED | OUTPATIENT
Start: 2024-05-28 | End: 2025-05-28

## 2024-05-28 NOTE — PATIENT INSTRUCTIONS
Increase your Venlafaxine dose to 150mg daily. We recommend that you establish care at a community mental health center that offers both therapy and medication management. Here are two organizations that might be appropriate. Please note, these organizations are not part of [facility]. Contact them directly to find out if they take your insurance and/or to schedule an appointment.     [facility]: [email or web address]  - [phone]   - Locations in [facility], [facility]: [email or web address]   - East Office: [phone]   - [facility] Office: [phone]   - [facility] Office ([place]): [phone]   - [place] Office: [phone]      IN CASE OF EMERGENCY:  - Call our office ([phone]) right away or travel to the nearest emergency department if you have new or worsening mental health symptoms; unusual changes in behavior, mood, thoughts or feelings; thoughts of wanting to harm yourself or other people; or if you are experiencing serious medication side effects.   - Call/text the Suicide and Crisis Lifeline at 988  - Or, Call 911    [facility]’s National Helpline: 1-998-286-HELP (0280) (also known as the Treatment Referral Routing Service), or TTY: 1-719.868.8778 is a confidential, free, 24-hour-a-day, 365-day-a-year, information service, in English and Gabonese, for individuals and family members facing mental and/or substance use disorders. This service provides referrals to local treatment facilities, support groups, and community-based organizations. You can also go to www.findtreatment.gov to find mental health or substance abuse counseling and other resources.
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
## 2024-05-29 LAB — VIT B1 PYROPHOSHATE BLD-SCNC: 129 NMOL/L (ref 70–180)

## 2024-05-30 NOTE — PROGRESS NOTES
Bariatric Surgery Program - [facility]    PREOPERATIVE, MULTIDISCIPLINARY, MEDICALLY SUPERVISED, REDUCED CALORIE DIET, BEHAVIOR MODIFICATION AND EXERCISE PROGRAM        S: MSWL 3/6. Patient reports eating 3 meals/day, 1 snack. Patient is eating more protein and vegetables at meals. Patient reports drinking water and SF fluids, practicing 30's rule, but admits he needs to be more consistent. Patient has increased exercise.     B - 1 piece of toast, sausage, smoothie   L - greens and pork chop  D - steak dee dee wrap  Snacks: apples w/ PB   Fluids: propel, cirkul, water       Exercise: walk 3 times/week, gym 2d/week, basketball     O: Wt: 404 lb (5/23/24)   Ht:  66 in   BMI: 65.21    Goal: 5% body weight loss over the course of program - MET     Dietary recommendation:   1.Continue to drink 64oz water or sugar free fluid daily. Avoid alcohol.   2. Practice the 30-30-30 rule by drinking between meals.  3.Continue to eat 3 meals and 1 snack daily.   4. Add protein powder to your breakfast smoothie.   5. Continue to take a multivitamin daily.  6. Continue to exercise, work towards a goal of 250 minutes/week.       A/P: Pt is making good progress, weight is down 15# since IV. Patient encouraged to continue with regimen. Patient genet attend MSWL class in June and then follow up to evaluate clearance with RD in July.

## 2024-05-31 ENCOUNTER — NUTRITION (OUTPATIENT)
Dept: SURGERY | Facility: CLINIC | Age: 34
End: 2024-05-31
Payer: COMMERCIAL

## 2024-05-31 DIAGNOSIS — Z98.84 BARIATRIC SURGERY STATUS: ICD-10-CM

## 2024-05-31 DIAGNOSIS — E66.01 CLASS 3 SEVERE OBESITY WITH SERIOUS COMORBIDITY AND BODY MASS INDEX (BMI) OF 60.0 TO 69.9 IN ADULT, UNSPECIFIED OBESITY TYPE (MULTI): Primary | ICD-10-CM

## 2024-05-31 NOTE — PROGRESS NOTES
I reviewed the resident/fellow's documentation and discussed the patient with the resident/fellow. I agree with the resident/fellow's medical decision making as documented in the note.     Bessy Ríos MD

## 2024-07-18 ENCOUNTER — HOSPITAL ENCOUNTER (EMERGENCY)
Facility: HOSPITAL | Age: 34
Discharge: HOME | End: 2024-07-18
Attending: EMERGENCY MEDICINE
Payer: COMMERCIAL

## 2024-07-18 ENCOUNTER — CLINICAL SUPPORT (OUTPATIENT)
Dept: EMERGENCY MEDICINE | Facility: HOSPITAL | Age: 34
End: 2024-07-18
Payer: COMMERCIAL

## 2024-07-18 ENCOUNTER — APPOINTMENT (OUTPATIENT)
Dept: RADIOLOGY | Facility: HOSPITAL | Age: 34
End: 2024-07-18
Payer: COMMERCIAL

## 2024-07-18 VITALS
OXYGEN SATURATION: 99 % | DIASTOLIC BLOOD PRESSURE: 85 MMHG | WEIGHT: 315 LBS | BODY MASS INDEX: 50.62 KG/M2 | RESPIRATION RATE: 16 BRPM | HEIGHT: 66 IN | HEART RATE: 78 BPM | SYSTOLIC BLOOD PRESSURE: 154 MMHG | TEMPERATURE: 98.4 F

## 2024-07-18 DIAGNOSIS — M94.0 COSTOCHONDRITIS: Primary | ICD-10-CM

## 2024-07-18 LAB
ALBUMIN SERPL BCP-MCNC: 4 G/DL (ref 3.4–5)
ALP SERPL-CCNC: 52 U/L (ref 33–120)
ALT SERPL W P-5'-P-CCNC: 21 U/L (ref 10–52)
ANION GAP SERPL CALC-SCNC: 10 MMOL/L (ref 10–20)
AST SERPL W P-5'-P-CCNC: 19 U/L (ref 9–39)
ATRIAL RATE: 82 BPM
BASOPHILS # BLD AUTO: 0.06 X10*3/UL (ref 0–0.1)
BASOPHILS NFR BLD AUTO: 0.6 %
BILIRUB SERPL-MCNC: 0.4 MG/DL (ref 0–1.2)
BUN SERPL-MCNC: 12 MG/DL (ref 6–23)
CALCIUM SERPL-MCNC: 9.6 MG/DL (ref 8.6–10.6)
CARDIAC TROPONIN I PNL SERPL HS: <3 NG/L (ref 0–53)
CHLORIDE SERPL-SCNC: 101 MMOL/L (ref 98–107)
CO2 SERPL-SCNC: 31 MMOL/L (ref 21–32)
CREAT SERPL-MCNC: 0.83 MG/DL (ref 0.5–1.3)
EGFRCR SERPLBLD CKD-EPI 2021: >90 ML/MIN/1.73M*2
EOSINOPHIL # BLD AUTO: 0.41 X10*3/UL (ref 0–0.7)
EOSINOPHIL NFR BLD AUTO: 4.2 %
ERYTHROCYTE [DISTWIDTH] IN BLOOD BY AUTOMATED COUNT: 16.4 % (ref 11.5–14.5)
GLUCOSE SERPL-MCNC: 88 MG/DL (ref 74–99)
HCT VFR BLD AUTO: 42.3 % (ref 41–52)
HGB BLD-MCNC: 13.2 G/DL (ref 13.5–17.5)
IMM GRANULOCYTES # BLD AUTO: 0.05 X10*3/UL (ref 0–0.7)
IMM GRANULOCYTES NFR BLD AUTO: 0.5 % (ref 0–0.9)
LYMPHOCYTES # BLD AUTO: 3.03 X10*3/UL (ref 1.2–4.8)
LYMPHOCYTES NFR BLD AUTO: 30.7 %
MCH RBC QN AUTO: 23.8 PG (ref 26–34)
MCHC RBC AUTO-ENTMCNC: 31.2 G/DL (ref 32–36)
MCV RBC AUTO: 76 FL (ref 80–100)
MONOCYTES # BLD AUTO: 0.83 X10*3/UL (ref 0.1–1)
MONOCYTES NFR BLD AUTO: 8.4 %
NEUTROPHILS # BLD AUTO: 5.49 X10*3/UL (ref 1.2–7.7)
NEUTROPHILS NFR BLD AUTO: 55.6 %
NRBC BLD-RTO: 0.2 /100 WBCS (ref 0–0)
P AXIS: 61 DEGREES
P OFFSET: 183 MS
P ONSET: 121 MS
PLATELET # BLD AUTO: 381 X10*3/UL (ref 150–450)
POTASSIUM SERPL-SCNC: 4.3 MMOL/L (ref 3.5–5.3)
PR INTERVAL: 184 MS
PROT SERPL-MCNC: 7.8 G/DL (ref 6.4–8.2)
Q ONSET: 213 MS
QRS COUNT: 14 BEATS
QRS DURATION: 88 MS
QT INTERVAL: 368 MS
QTC CALCULATION(BAZETT): 429 MS
QTC FREDERICIA: 408 MS
R AXIS: 43 DEGREES
RBC # BLD AUTO: 5.55 X10*6/UL (ref 4.5–5.9)
SODIUM SERPL-SCNC: 138 MMOL/L (ref 136–145)
T AXIS: 63 DEGREES
T OFFSET: 397 MS
VENTRICULAR RATE: 82 BPM
WBC # BLD AUTO: 9.9 X10*3/UL (ref 4.4–11.3)

## 2024-07-18 PROCEDURE — 71045 X-RAY EXAM CHEST 1 VIEW: CPT | Mod: FOREIGN READ | Performed by: STUDENT IN AN ORGANIZED HEALTH CARE EDUCATION/TRAINING PROGRAM

## 2024-07-18 PROCEDURE — 99284 EMERGENCY DEPT VISIT MOD MDM: CPT | Mod: 25

## 2024-07-18 PROCEDURE — 85025 COMPLETE CBC W/AUTO DIFF WBC: CPT | Performed by: EMERGENCY MEDICINE

## 2024-07-18 PROCEDURE — 36415 COLL VENOUS BLD VENIPUNCTURE: CPT | Performed by: EMERGENCY MEDICINE

## 2024-07-18 PROCEDURE — 84075 ASSAY ALKALINE PHOSPHATASE: CPT | Performed by: EMERGENCY MEDICINE

## 2024-07-18 PROCEDURE — 84484 ASSAY OF TROPONIN QUANT: CPT | Performed by: EMERGENCY MEDICINE

## 2024-07-18 PROCEDURE — 80053 COMPREHEN METABOLIC PANEL: CPT | Performed by: EMERGENCY MEDICINE

## 2024-07-18 PROCEDURE — 71045 X-RAY EXAM CHEST 1 VIEW: CPT

## 2024-07-18 PROCEDURE — 96374 THER/PROPH/DIAG INJ IV PUSH: CPT

## 2024-07-18 PROCEDURE — 93005 ELECTROCARDIOGRAM TRACING: CPT

## 2024-07-18 PROCEDURE — 2500000004 HC RX 250 GENERAL PHARMACY W/ HCPCS (ALT 636 FOR OP/ED): Mod: SE

## 2024-07-18 RX ORDER — METHOCARBAMOL 500 MG/1
500 TABLET, FILM COATED ORAL 2 TIMES DAILY
Qty: 20 TABLET | Refills: 0 | Status: SHIPPED | OUTPATIENT
Start: 2024-07-18 | End: 2024-07-28

## 2024-07-18 RX ORDER — METHOCARBAMOL 500 MG/1
500 TABLET, FILM COATED ORAL ONCE
Status: DISCONTINUED | OUTPATIENT
Start: 2024-07-18 | End: 2024-07-18 | Stop reason: HOSPADM

## 2024-07-18 RX ORDER — IBUPROFEN 600 MG/1
600 TABLET ORAL EVERY 6 HOURS PRN
Qty: 120 TABLET | Refills: 0 | Status: SHIPPED | OUTPATIENT
Start: 2024-07-18 | End: 2024-08-17

## 2024-07-18 RX ORDER — KETOROLAC TROMETHAMINE 15 MG/ML
15 INJECTION, SOLUTION INTRAMUSCULAR; INTRAVENOUS ONCE
Status: COMPLETED | OUTPATIENT
Start: 2024-07-18 | End: 2024-07-18

## 2024-07-18 ASSESSMENT — LIFESTYLE VARIABLES
EVER HAD A DRINK FIRST THING IN THE MORNING TO STEADY YOUR NERVES TO GET RID OF A HANGOVER: NO
TOTAL SCORE: 0
HAVE YOU EVER FELT YOU SHOULD CUT DOWN ON YOUR DRINKING: NO
EVER FELT BAD OR GUILTY ABOUT YOUR DRINKING: NO
HAVE PEOPLE ANNOYED YOU BY CRITICIZING YOUR DRINKING: NO

## 2024-07-18 ASSESSMENT — PAIN SCALES - GENERAL
PAINLEVEL_OUTOF10: 7
PAINLEVEL_OUTOF10: 6

## 2024-07-18 ASSESSMENT — PAIN - FUNCTIONAL ASSESSMENT: PAIN_FUNCTIONAL_ASSESSMENT: 0-10

## 2024-07-18 ASSESSMENT — PAIN DESCRIPTION - DESCRIPTORS: DESCRIPTORS: SQUEEZING;STABBING

## 2024-07-18 NOTE — ED PROVIDER NOTES
Emergency Department Provider Note        History of Present Illness     History provided by: Patient  Limitations to History: None  External Records Reviewed with Brief Summary: Discharge Summary from 3/20/24 which showed presentation consistent with chest pain with a reassuring workup    HPI:  Luis Mcgill is a 33 y.o. male history of obesity, migraines, hidradenitis suppurativa, priapism and anxiety presenting to the emergency department with chest pain and shortness of breath.  States his symptoms started this morning.  Describes it as substernal, nonradiating with subjective shortness of breath.  No previous history of DVT or PE and has never been on blood thinners.  Denies recent travel, immobilization or history of malignancy.  States he has had chest pain like this before and reports that it feels similar today.  Reports symptoms consistent with chest wall pain.  Denies affiliated fever, chills, headache or vision changes, no nausea, vomiting or diarrhea.  No abdominal pain.  No pain that radiates into the back.  No numbness or weakness in the upper and lower extremities.    Physical Exam   Triage vitals:  T 36.9 °C (98.4 °F)  HR 78  /85  RR 16  O2 99 % None (Room air)    General: Awake, alert, in no acute distress  Eyes: Gaze conjugate.  No scleral icterus or injection  HENT: Normo-cephalic, atraumatic. No stridor  CV: Regular rate, regular rhythm. Radial pulses 2+ bilaterally  Resp: Breathing non-labored, speaking in full sentences.  Clear to auscultation bilaterally  GI: Soft, non-distended, non-tender. No rebound or guarding.  MSK/Extremities: No gross bony deformities. Moving all extremities  Skin: Warm. Appropriate color  Neuro: Alert. Oriented. Face symmetric. Speech is fluent.  Gross strength and sensation intact in b/l UE and LEs  Psych: Appropriate mood and affect    Medical Decision Making & ED Course   Medical Decision Makin y.o. male Presenting to the emergency department  with chest pain.  On physical exam, patient is hemodynamically stable and in no acute distress, is well-appearing.  No evidence of increased respiratory effort and no oxygen requirement.  Physical exam without evidence of volume overload so low concern for heart failure.  EKG without signs of active ischemia.  Presentation not consistent with an acute PE and he is PERC negative.  Has had previous CT PE studies which are negative.  Chest x-ray does not show an acute infectious infiltrate suggestive of a pneumonia.  No mediastinal widening concerning for thoracic aortic dissection or pneumothorax as lung fields are clear.  Patient's without infectious symptoms or recent infection with a clear chest x-ray so low concern for pericarditis or myocarditis.  Labs are grossly unremarkable without evidence of a leukocytosis, hemoglobin is stable and troponin within normal limits.  His heart score is 1 so low risk for true cardiac event at this time and patient has been evaluated in the emergency department on several occasions for similar presentation with reassuring workup. Plan will be for discharge home pending a normal troponin.  ----  Scoring Tools Utilized:   Heart score 1    Differential diagnoses considered include but are not limited to: Costochondritis, ACS, MI, pleurisy, viral process, anxiety induced chest pain, angina     Social Determinants of Health which Significantly Impact Care: None identified     EKG Independent Interpretation: EKG interpreted by myself. Please see ED Course for full interpretation.    Independent Result Review and Interpretation: Relevant laboratory and radiographic results were reviewed and independently interpreted by myself.  As necessary, they are commented on in the ED Course.    Chronic conditions affecting the patient's care: As documented above in [facility]    The patient was discussed with the following consultants/services: None    Care Considerations: As documented above in [facility]    ED  Course:  ED Course as of 07/19/24 1244   u Jul 18, 2024   1330 EKG performed at 10:52 AM.  Normal sinus rhythm with a rate of 82.  Normal axis, normal intervals.  No ST elevation or depression, no T wave abnormalities.  When compared to previous EKG.  When compared to EKG on April 2024 EKG is unchanged [PW]   1604 Troponin I, High Sensitivity (CMC): <3  Troponin was negative.  Patient's lab work was reviewed within normal limits.  Chest x-ray was independently reviewed and negative for pneumonia or pneumothorax.  Patient will be discharged home in stable condition. [STEPHANIE]      ED Course User Index  [STEPHANIE] Paulina Kumar MD  [PW] Annabella Barakat DO         Diagnoses as of 07/19/24 1244   Costochondritis     Disposition   As a result of the work-up, the patient was discharged home.  he was informed of his diagnosis and instructed to come back with any concerns or worsening of condition.  he and was agreeable to the plan as discussed above.  he was given the opportunity to ask questions.  All of the patient's questions were answered.    Procedures   Procedures    Patient seen and discussed with ED attending physician.    Annabella Barakat DO  Emergency Medicine      Annabella Barakat DO  Resident  07/19/24 1065

## 2024-07-18 NOTE — DISCHARGE INSTRUCTIONS
You are found to have elevated blood pressure here in the emergency department I recommend that you follow-up with your primary care doctor soon as possible to have your blood pressure reevaluated and see if you require either a change in medication or be started on medication.    You have been evaluated in the Emergency Department for chest pain. There are many different causes of chest pain. Some of these causes could be serious, such as a heart attack or blood clot in the lungs, but many other causes are not life threatening, such as heartburn, viral infections, bruised bones/muscles, etc. On evaluation we did not find any emergent causes for your chest pain at this time.     Please return to Emergency Department or seek medical attention immediately if you have acute worsening in your chest pain or develop shortness of breath, repeated vomiting, fever, altered level of consciousness, coughing up blood, or start sweating and feel clammy.    If you were prescribed any medicine for home, please take as prescribed by your health-care provider. If you were given any follow-up appointments or numbers to call, please do so as instructed. Avoid any activities that bring on the chest pain. Also, avoid any tobacco products or excessive alcohol. Please ensure understanding of these instructions prior to discharge.    Please call your primary care physician and follow-up with them in the next 1 to 2 days.

## 2024-07-18 NOTE — Clinical Note
Luis Mcgill was seen and treated in our emergency department on 7/18/2024.  He may return to work on 07/19/2024.       If you have any questions or concerns, please don't hesitate to call.      Henrietta Mckinney MD

## 2024-07-18 NOTE — PROGRESS NOTES
Emergency Department Transition of Care Note       Signout   I received Luis Mcgill in signout from Dr. Anne.  Please see the ED Provider Note for all HPI, PE and MDM up to the time of signout at 3 PM.  This is in addition to the primary record.    In brief Luis Mcgill is an 33 y.o. male presenting for chest pain and shortness of breath.  EKG was nonischemic.      At the time of signout we were awaiting:  Lab work and chest x-ray    ED Course & Medical Decision Making   Medical Decision Making:  Under my care, lab work resulted which showed no abnormalities.  Troponins within normal limits.  Patient's chest x-ray was negative and independently reviewed.  Pneumonia or pneumothorax.  Patient was discharged home in stable condition.    ED Course:  ED Course as of 07/18/24 1605   Thu Jul 18, 2024   1330 EKG performed at 10:52 AM.  Normal sinus rhythm with a rate of 82.  Normal axis, normal intervals.  No ST elevation or depression, no T wave abnormalities.  When compared to previous EKG.  When compared to EKG on April 2024 EKG is unchanged [PW]   1604 Troponin I, High Sensitivity (CMC): <3  Troponin was negative.  Patient's lab work was reviewed within normal limits.  Chest x-ray was independently reviewed and negative for pneumonia or pneumothorax.  Patient will be discharged home in stable condition. [STEPHANIE]      ED Course User Index  [STEPHANIE] Paulina Kumar MD  [PW] Annabella Barakat,          Diagnoses as of 07/18/24 1605   Costochondritis       Disposition   As a result of the work-up, the patient was discharged home.  he was informed of his diagnosis and instructed to come back with any concerns or worsening of condition.  he and was agreeable to the plan as discussed above.  he was given the opportunity to ask questions.  All of the patient's questions were answered.    Procedures   Procedures    Patient seen and discussed with the ED physician.     Paulina Kumar MD  Emergency Medicine

## 2024-07-19 NOTE — PROGRESS NOTES
Bariatric Surgery Program - [facility]    PREOPERATIVE, MULTIDISCIPLINARY, MEDICALLY SUPERVISED, REDUCED CALORIE DIET, BEHAVIOR MODIFICATION AND EXERCISE PROGRAM       S:  MSWL 4/6; #, #. Weight is up 10# since LV.     Patient reports he is working on eating 3 meals/day consistently. Tends to miss lunch on Sundays. Reports no snacking after dinner, drinking SF fluids. Still working on practicing 30's rule.   Met with Gianna Caro at [facility] on 7/17/24 and was started on B12 1000 mcg, vitamin 35,000 international units, wellbutrin, trulicity. Patient will follow up with her next month for weight check and medication review.     Diet recall:  B - toast, sausage, chocolate  L - none  D - mashed potatoes, baked chicken thighs, corn   Snacks - 1c cherries   Fluids: diet iced tea, water, propel, powerade zero,     Exercise: 10 minute walk 3 times/day     O: Wt: 414 lb (7/17/24)   Ht:  66 in   BMI: 66.8    Goal: 5% body weight loss over the course of program    Dietary recommendation:   1.Continue to drink 64oz water or sugar free fluid daily. Avoid alcohol.   2. Practice the 30-30-30 rule by drinking between meals.  3.Continue to eat 3 meals and 1 snack daily.   4. Add protein powder to your breakfast smoothie.   5. Continue to take a multivitamin daily.  6. Continue to exercise, work towards a goal of 250 minutes/week.     A/P: Pt appears to be motivated to work on adding back more exercise. Patient has a goal to eat 3 meal/day, will rty protein drink in place of meal to avoid skipping. Patient will follow up in 1 month for weight check and diet recall.

## 2024-07-22 ENCOUNTER — NUTRITION (OUTPATIENT)
Dept: SURGERY | Facility: HOSPITAL | Age: 34
End: 2024-07-22
Payer: COMMERCIAL

## 2024-07-22 DIAGNOSIS — E66.01 CLASS 3 SEVERE OBESITY WITH SERIOUS COMORBIDITY AND BODY MASS INDEX (BMI) OF 60.0 TO 69.9 IN ADULT, UNSPECIFIED OBESITY TYPE (MULTI): ICD-10-CM

## 2024-07-22 DIAGNOSIS — Z98.84 BARIATRIC SURGERY STATUS: Primary | ICD-10-CM

## 2024-07-25 ENCOUNTER — TELEPHONE (OUTPATIENT)
Dept: PRIMARY CARE | Facility: HOSPITAL | Age: 34
End: 2024-07-25
Payer: COMMERCIAL

## 2024-07-25 DIAGNOSIS — G47.33 OBSTRUCTIVE SLEEP APNEA: ICD-10-CM

## 2024-07-25 DIAGNOSIS — E66.8 EXTREME OBESITY: Primary | ICD-10-CM

## 2024-07-25 RX ORDER — TIRZEPATIDE 2.5 MG/.5ML
2.5 INJECTION, SOLUTION SUBCUTANEOUS
Qty: 1 PEN | Refills: 4 | Status: SHIPPED | OUTPATIENT
Start: 2024-07-28

## 2024-07-25 NOTE — TELEPHONE ENCOUNTER
Patient states Trulicity not working and he would like to switch to  Mounjaro.    Please call patient to advise if this is possible    [facility] on [address]

## 2024-07-25 NOTE — TELEPHONE ENCOUNTER
Called patient to discuss. He reports this is his second time trying Trulicity for morbid obesity and states he has not lost any weight despite trying to exercise. He is requesting switching to Mounjaro. Given the patient has a BMI of 67 with associated complication of NICOLE and pre-diabetes, he is a good candidate for a GLP-1 agonist. I ordered Mounjaro starting dose of 2.5 mg once weekly for four weeks and will complete prior auth if required. Patient has an appointment on 7/30 with Dr. Saba in [facility] and can discuss the medication and the status of the prior auth further at that time.

## 2024-07-30 ENCOUNTER — OFFICE VISIT (OUTPATIENT)
Dept: PRIMARY CARE | Facility: HOSPITAL | Age: 34
End: 2024-07-30
Payer: COMMERCIAL

## 2024-07-30 VITALS
DIASTOLIC BLOOD PRESSURE: 71 MMHG | HEART RATE: 90 BPM | BODY MASS INDEX: 50.62 KG/M2 | OXYGEN SATURATION: 97 % | HEIGHT: 66 IN | TEMPERATURE: 97.7 F | SYSTOLIC BLOOD PRESSURE: 106 MMHG | WEIGHT: 315 LBS

## 2024-07-30 DIAGNOSIS — E66.8 EXTREME OBESITY: ICD-10-CM

## 2024-07-30 DIAGNOSIS — G47.33 OBSTRUCTIVE SLEEP APNEA: ICD-10-CM

## 2024-07-30 PROCEDURE — 1036F TOBACCO NON-USER: CPT

## 2024-07-30 PROCEDURE — 3008F BODY MASS INDEX DOCD: CPT

## 2024-07-30 PROCEDURE — 99214 OFFICE O/P EST MOD 30 MIN: CPT

## 2024-07-30 PROCEDURE — 99214 OFFICE O/P EST MOD 30 MIN: CPT | Mod: GC

## 2024-07-30 RX ORDER — TIRZEPATIDE 2.5 MG/.5ML
INJECTION, SOLUTION SUBCUTANEOUS
Qty: 4 PEN | Refills: 11 | Status: SHIPPED | OUTPATIENT
Start: 2024-07-30 | End: 2025-08-27

## 2024-07-30 ASSESSMENT — COLUMBIA-SUICIDE SEVERITY RATING SCALE - C-SSRS
4. HAVE YOU HAD THESE THOUGHTS AND HAD SOME INTENTION OF ACTING ON THEM?: NO
2. HAVE YOU ACTUALLY HAD ANY THOUGHTS OF KILLING YOURSELF?: YES
5. HAVE YOU STARTED TO WORK OUT OR WORKED OUT THE DETAILS OF HOW TO KILL YOURSELF? DO YOU INTEND TO CARRY OUT THIS PLAN?: NO
6. HAVE YOU EVER DONE ANYTHING, STARTED TO DO ANYTHING, OR PREPARED TO DO ANYTHING TO END YOUR LIFE?: NO
1. IN THE PAST MONTH, HAVE YOU WISHED YOU WERE DEAD OR WISHED YOU COULD GO TO SLEEP AND NOT WAKE UP?: YES
6. HAVE YOU EVER DONE ANYTHING, STARTED TO DO ANYTHING, OR PREPARED TO DO ANYTHING TO END YOUR LIFE?: NO

## 2024-07-30 ASSESSMENT — PATIENT HEALTH QUESTIONNAIRE - PHQ9
7. TROUBLE CONCENTRATING ON THINGS, SUCH AS READING THE NEWSPAPER OR WATCHING TELEVISION: NOT AT ALL
1. LITTLE INTEREST OR PLEASURE IN DOING THINGS: NEARLY EVERY DAY
9. THOUGHTS THAT YOU WOULD BE BETTER OFF DEAD, OR OF HURTING YOURSELF: NEARLY EVERY DAY
6. FEELING BAD ABOUT YOURSELF - OR THAT YOU ARE A FAILURE OR HAVE LET YOURSELF OR YOUR FAMILY DOWN: NEARLY EVERY DAY
2. FEELING DOWN, DEPRESSED OR HOPELESS: NEARLY EVERY DAY
SUM OF ALL RESPONSES TO PHQ QUESTIONS 1-9: 16
5. POOR APPETITE OR OVEREATING: SEVERAL DAYS
SUM OF ALL RESPONSES TO PHQ9 QUESTIONS 1 AND 2: 6
3. TROUBLE FALLING OR STAYING ASLEEP OR SLEEPING TOO MUCH: NOT AT ALL
8. MOVING OR SPEAKING SO SLOWLY THAT OTHER PEOPLE COULD HAVE NOTICED. OR THE OPPOSITE, BEING SO FIGETY OR RESTLESS THAT YOU HAVE BEEN MOVING AROUND A LOT MORE THAN USUAL: NOT AT ALL
4. FEELING TIRED OR HAVING LITTLE ENERGY: NEARLY EVERY DAY

## 2024-07-30 ASSESSMENT — ENCOUNTER SYMPTOMS
LOSS OF SENSATION IN FEET: 0
OCCASIONAL FEELINGS OF UNSTEADINESS: 1
DEPRESSION: 1

## 2024-07-30 ASSESSMENT — PAIN SCALES - GENERAL: PAINLEVEL: 10-WORST PAIN EVER

## 2024-07-30 NOTE — PATIENT INSTRUCTIONS
Dear Mr. Mcgill,  Today we discussed several things:  You have Mounjaro ordered at your pharmacy - please try to pick it up and call us if there are any issues.  Your dizziness is concerning - please limit your use of Hydroxyzine and increase you fluid intake.  Try to drink fluids and use throat lozenges when you have coughing attacks.  Please follow up with your bariatric surgery team  Please follow up with your mental health provider.

## 2024-07-30 NOTE — PROGRESS NOTES
I reviewed the resident/fellow's documentation and discussed the patient with the resident/fellow. I agree with the resident/fellow's medical decision making as documented in the note.    Case d/w Dr. Saab; agree with his A/P. Pt is following up closely with the weight loss clinic. Encouraged to keep working on weight loss. We could trial Monjaro, am not sure if insurance will approve it given that pt has preDM, not DM. BP at goal. Pt encouraged to cut down on his ETOH use and increase intake of water.    Hyun Guaman MD
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
## 2024-07-31 NOTE — TELEPHONE ENCOUNTER
Patient A1c 6.1. Prior auth appears to have been rejected. Resent Prior auth. Informed patient that it is not likely that insurance will cover this medication. I mentioned metformin as a possible agent for prediabetes that have some mild weight loss effects. Next PCP should discuss this issue.

## 2024-08-26 NOTE — PROGRESS NOTES
Bariatric Surgery Program - [facility]    PREOPERATIVE, MULTIDISCIPLINARY, MEDICALLY SUPERVISED, REDUCED CALORIE DIET, BEHAVIOR MODIFICATION AND EXERCISE PROGRAM       S: IW: 420# LV: 414#  MSWL 5/6  Patient states he feels discouraged and has not been following his meal plan and exercise this past month. Patient states he also stopped taking medication that was started last month. Has a follow up with provider early September and is thinking of starting to take them again. This is dosage needs to be increased. Patient appears to be struggling to manage depression, states he feels tired all the time. Wants to lose weight, but hs trouble with consistently following his meal plan and exercise due to feeling tired. Patient was able to name 3 goals to focus on this month and was referred to follow up with a counselor or therapist to address his depression.       Exercise: did not discuss    O: Wt: 412 lb (7/30/24)   Ht:  66 in   BMI: 66.56    Goal: 5% body weight loss over the course of program    Dietary recommendation:   1.Drink 64 ounces of water or sugar free fluids daily   2. Take your medications as prescribed daily.  3. Exercise for 10 minutes daily   4. Look into scheduling an appointment with a counselor or therapist to finds strategies to better manage your depression.         A/P: Pt appears to be struggling with his depression at this time which is keeping him from exercising and following recommended meal plan. Patient has a goal to exercise 10 minutes/day, take his medications daily, and drink 64 ounces of water daily. Patient will follow up in 5 weeks with progress. Patient was encouraged to follow up with counselor to better address depression.

## 2024-08-27 ENCOUNTER — APPOINTMENT (OUTPATIENT)
Dept: HEMATOLOGY/ONCOLOGY | Facility: HOSPITAL | Age: 34
End: 2024-08-27
Payer: COMMERCIAL

## 2024-08-27 ENCOUNTER — NUTRITION (OUTPATIENT)
Dept: SURGERY | Facility: HOSPITAL | Age: 34
End: 2024-08-27
Payer: COMMERCIAL

## 2024-08-27 DIAGNOSIS — E66.01 CLASS 3 SEVERE OBESITY WITH SERIOUS COMORBIDITY AND BODY MASS INDEX (BMI) OF 60.0 TO 69.9 IN ADULT, UNSPECIFIED OBESITY TYPE (MULTI): ICD-10-CM

## 2024-08-27 DIAGNOSIS — Z98.84 BARIATRIC SURGERY STATUS: Primary | ICD-10-CM

## 2024-09-30 ENCOUNTER — TELEPHONE (OUTPATIENT)
Dept: OPHTHALMOLOGY | Facility: CLINIC | Age: 34
End: 2024-09-30
Payer: COMMERCIAL

## 2024-09-30 NOTE — TELEPHONE ENCOUNTER
Pt called triage line regarding new floaters right eye x 1 week with blurry VA. Declines ED. Offered triage appointment. Agrees to come to triage 10/1 at 1pm.

## 2024-10-01 ENCOUNTER — OFFICE VISIT (OUTPATIENT)
Dept: OPHTHALMOLOGY | Facility: CLINIC | Age: 34
End: 2024-10-01
Payer: COMMERCIAL

## 2024-10-01 DIAGNOSIS — H16.009 PERIPHERAL ULCERATIVE KERATITIS: Primary | ICD-10-CM

## 2024-10-01 PROCEDURE — 99203 OFFICE O/P NEW LOW 30 MIN: CPT | Performed by: OPHTHALMOLOGY

## 2024-10-01 RX ORDER — NEOMYCIN SULFATE, POLYMYXIN B SULFATE AND DEXAMETHASONE 3.5; 10000; 1 MG/ML; [USP'U]/ML; MG/ML
1 SUSPENSION/ DROPS OPHTHALMIC 3 TIMES DAILY
Qty: 1 ML | Refills: 0 | Status: SHIPPED | OUTPATIENT
Start: 2024-10-01 | End: 2024-10-08

## 2024-10-01 ASSESSMENT — VISUAL ACUITY
OD_SC: 20/15
OS_SC: 20/20
METHOD: SNELLEN - LINEAR

## 2024-10-01 ASSESSMENT — CONF VISUAL FIELD
OS_NORMAL: 1
METHOD: COUNTING FINGERS
OD_INFERIOR_NASAL_RESTRICTION: 0
OD_SUPERIOR_TEMPORAL_RESTRICTION: 0
OD_NORMAL: 1
OD_INFERIOR_TEMPORAL_RESTRICTION: 0
OS_SUPERIOR_TEMPORAL_RESTRICTION: 0
OD_SUPERIOR_NASAL_RESTRICTION: 0
OS_INFERIOR_NASAL_RESTRICTION: 0
OS_INFERIOR_TEMPORAL_RESTRICTION: 0
OS_SUPERIOR_NASAL_RESTRICTION: 0

## 2024-10-01 ASSESSMENT — CUP TO DISC RATIO
OS_RATIO: 0.5
OD_RATIO: 0.5

## 2024-10-01 ASSESSMENT — ENCOUNTER SYMPTOMS
NEUROLOGICAL NEGATIVE: 0
ALLERGIC/IMMUNOLOGIC NEGATIVE: 0
ENDOCRINE NEGATIVE: 0
PSYCHIATRIC NEGATIVE: 0
HEMATOLOGIC/LYMPHATIC NEGATIVE: 0
CONSTITUTIONAL NEGATIVE: 0
CARDIOVASCULAR NEGATIVE: 0
GASTROINTESTINAL NEGATIVE: 0
RESPIRATORY NEGATIVE: 0
MUSCULOSKELETAL NEGATIVE: 0

## 2024-10-01 ASSESSMENT — TONOMETRY
IOP_METHOD: TONOPEN
OD_IOP_MMHG: 14
OS_IOP_MMHG: 15

## 2024-10-01 ASSESSMENT — SLIT LAMP EXAM - LIDS
COMMENTS: NORMAL
COMMENTS: NORMAL

## 2024-10-01 ASSESSMENT — EXTERNAL EXAM - LEFT EYE: OS_EXAM: NORMAL

## 2024-10-01 ASSESSMENT — EXTERNAL EXAM - RIGHT EYE: OD_EXAM: NORMAL

## 2024-10-01 NOTE — PROGRESS NOTES
Assessment/Plan       #staph marginal keratitis OD  -Patient with 1 week of gritty sensation OD, no preceding trauma or inciting event   -Exam with excellent nVA 20/15 and 20/20, IOP wnl, pupils 3>1 no RAPD, color plates full 14/14 OU EOMs full   -SLE notable for 1+ inj OD and 0.5mmx0.75mm peripheral ulcer 5:00, with corneal scarring temporally, OS with corneal scarring 3:00-6:00 with NV  -DFE c/d 0.5 m/v/p wnl OU  -Patient counseled on good lid hygiene  -Start WC/LS BID OU  -Start maxitrol gtt TID OD   -Follow up in 2 weeks

## 2024-10-09 ENCOUNTER — DOCUMENTATION (OUTPATIENT)
Dept: PRIMARY CARE | Facility: HOSPITAL | Age: 34
End: 2024-10-09
Payer: COMMERCIAL

## 2024-10-09 ENCOUNTER — OFFICE VISIT (OUTPATIENT)
Dept: PRIMARY CARE | Facility: HOSPITAL | Age: 34
End: 2024-10-09
Payer: COMMERCIAL

## 2024-10-09 VITALS
BODY MASS INDEX: 50.62 KG/M2 | HEART RATE: 89 BPM | WEIGHT: 315 LBS | TEMPERATURE: 96.1 F | OXYGEN SATURATION: 99 % | SYSTOLIC BLOOD PRESSURE: 146 MMHG | HEIGHT: 66 IN | DIASTOLIC BLOOD PRESSURE: 89 MMHG

## 2024-10-09 DIAGNOSIS — E88.810 METABOLIC SYNDROME: ICD-10-CM

## 2024-10-09 DIAGNOSIS — G43.919 INTRACTABLE MIGRAINE WITHOUT STATUS MIGRAINOSUS, UNSPECIFIED MIGRAINE TYPE: Primary | ICD-10-CM

## 2024-10-09 DIAGNOSIS — E66.9 EXTREME OBESITY: ICD-10-CM

## 2024-10-09 PROCEDURE — RXMED WILLOW AMBULATORY MEDICATION CHARGE

## 2024-10-09 RX ORDER — PROPRANOLOL HYDROCHLORIDE 40 MG/1
40 TABLET ORAL 2 TIMES DAILY
Qty: 60 TABLET | Refills: 3 | Status: SHIPPED | OUTPATIENT
Start: 2024-10-09 | End: 2025-02-06

## 2024-10-09 RX ORDER — DULAGLUTIDE 3 MG/.5ML
3 INJECTION, SOLUTION SUBCUTANEOUS
Qty: 2 ML | Refills: 2 | Status: SHIPPED | OUTPATIENT
Start: 2024-10-13 | End: 2025-01-05

## 2024-10-09 ASSESSMENT — ENCOUNTER SYMPTOMS
WEAKNESS: 0
ABDOMINAL DISTENTION: 0
CONSTIPATION: 0
SHORTNESS OF BREATH: 0
DIZZINESS: 0
PALPITATIONS: 0
ABDOMINAL PAIN: 0
OCCASIONAL FEELINGS OF UNSTEADINESS: 0
HEADACHES: 1
CHILLS: 0
LOSS OF SENSATION IN FEET: 0
DIARRHEA: 0
NAUSEA: 0
FEVER: 0
WOUND: 0
COUGH: 0
VOMITING: 0
DEPRESSION: 1
DYSURIA: 0

## 2024-10-09 ASSESSMENT — PATIENT HEALTH QUESTIONNAIRE - PHQ9
SUM OF ALL RESPONSES TO PHQ QUESTIONS 1-9: 18
SUM OF ALL RESPONSES TO PHQ9 QUESTIONS 1 AND 2: 4
5. POOR APPETITE OR OVEREATING: MORE THAN HALF THE DAYS
7. TROUBLE CONCENTRATING ON THINGS, SUCH AS READING THE NEWSPAPER OR WATCHING TELEVISION: MORE THAN HALF THE DAYS
4. FEELING TIRED OR HAVING LITTLE ENERGY: MORE THAN HALF THE DAYS
10. IF YOU CHECKED OFF ANY PROBLEMS, HOW DIFFICULT HAVE THESE PROBLEMS MADE IT FOR YOU TO DO YOUR WORK, TAKE CARE OF THINGS AT HOME, OR GET ALONG WITH OTHER PEOPLE: VERY DIFFICULT
9. THOUGHTS THAT YOU WOULD BE BETTER OFF DEAD, OR OF HURTING YOURSELF: MORE THAN HALF THE DAYS
1. LITTLE INTEREST OR PLEASURE IN DOING THINGS: MORE THAN HALF THE DAYS
2. FEELING DOWN, DEPRESSED OR HOPELESS: MORE THAN HALF THE DAYS
6. FEELING BAD ABOUT YOURSELF - OR THAT YOU ARE A FAILURE OR HAVE LET YOURSELF OR YOUR FAMILY DOWN: MORE THAN HALF THE DAYS
3. TROUBLE FALLING OR STAYING ASLEEP OR SLEEPING TOO MUCH: MORE THAN HALF THE DAYS
8. MOVING OR SPEAKING SO SLOWLY THAT OTHER PEOPLE COULD HAVE NOTICED. OR THE OPPOSITE, BEING SO FIGETY OR RESTLESS THAT YOU HAVE BEEN MOVING AROUND A LOT MORE THAN USUAL: MORE THAN HALF THE DAYS

## 2024-10-09 ASSESSMENT — PAIN SCALES - GENERAL: PAINLEVEL: 10-WORST PAIN EVER

## 2024-10-09 ASSESSMENT — COLUMBIA-SUICIDE SEVERITY RATING SCALE - C-SSRS: 1. IN THE PAST MONTH, HAVE YOU WISHED YOU WERE DEAD OR WISHED YOU COULD GO TO SLEEP AND NOT WAKE UP?: NO

## 2024-10-09 NOTE — PATIENT INSTRUCTIONS
As discussed today, our plan is:     Medication changes: increase Trulicity to 3mg weekly, start propranolol 40mg twice daily  Consultations - you were referred to see the following specialists: Endocrinology  You should receive a call from central scheduling in the next few days if you do not receive a call within 3-5 business days please call [phone] to schedule your appointment.   4.   If you smoke or use other tobacco products, take steps to quit. Call [phone] for more information or to set up an appointment with  [facility]. The [place] Tobacco Quit Line is a free resource for people who don’t have insurance, receive Medicaid, pregnant women, or members of the [place] [facility]. Call 9-020-QUIT-NOW or 1-442.941.5950.    Please come back to see us in: 3 months  ------  If you have any problems or questions, please contact the clinic at [phone] to leave a message. Our fax number is [phone]. If your issue cannot wait until the next business day, please go to urgent care or the emergency department.     I also strongly urge all of my patients to register for [facility] by going to: [email or web address]  (The  staff can also send you a text/email link to register when you check out).    No shows: It is understandable if you are unable to make it to a visit, but please cancel your appointment instead of not showing up. This helps to give other patients access to primary care and keeps wait times low.        [facility]   [phone]

## 2024-10-09 NOTE — PROGRESS NOTES
Chief complaint: head pain    HPI:  Luis Mcgill is a 34 y.o. male with pmh significant for extreme obesity (follows with bariatric surgery), fibromyalgia, severe depression/anxiety with panic attacks, GERD, metabolic syndrome, and migraines who presents for headaches.    He has daily migraine headaches. He stopped taking sumatriptan because it wasn't helping. He takes tylenol instead and waits it out. He does not take any preventative medications to help prevent migraines from starting.    The new headache is one that have been going on for one month. They occur only during sex and he feels it in the back right of his head. He feels it coming on during the middle of intercourse and it peaks when he orgasms. He says his eyes and head become heavy and he has sharp pain in the back of his head and the room starts spinning. Most recently it happened last night and he took 3 tylenol pills and he was able to sleep it off. Sometimes it doesn't help and if he takes nothing it stays with him all night.    He does not measure blood pressure at home. Says he has never had high blood pressure before. He does not have any symptoms during other forms of exercise.    Medications:  Current Outpatient Medications   Medication Instructions    acetaminophen (TYLENOL) 650 mg, oral, Every 6 hours PRN    capsaicin (Zostrix) 0.025 % cream Topical, 2 times daily    hydrOXYzine HCL (ATARAX) 50 mg, oral, Every 6 hours PRN    methocarbamol (ROBAXIN) 500 mg, oral, 2 times daily    pantoprazole (PROTONIX) 40 mg, oral, Daily, Do not crush, chew, or split.    SUMAtriptan (IMITREX) 50 mg, oral, Once as needed, May repeat after 2 hours.    tadalafil (CIALIS) 2.5 mg, oral, Daily    tirzepatide (Mounjaro) 2.5 mg/0.5 mL pen injector Inject 2.5 mg under the skin 1 (one) time per week for 28 days, THEN 5 mg 1 (one) time per week.    Trulicity 0.75 mg, subcutaneous, Once Weekly    venlafaxine XR (EFFEXOR-XR) 150 mg, oral, Daily, Do not crush or  chew.       Allergies:  Allergies   Allergen Reactions    Amitriptyline Other     priapism    Amoxicillin Unknown    Citalopram Other     priapism    Pregabalin Other     priapism       Past medical history:  Past Medical History:   Diagnosis Date    Migraine     Priapism, unspecified 03/13/2015    Priapism       Surgical history:  Past Surgical History:   Procedure Laterality Date    OTHER SURGICAL HISTORY  02/11/2020    No history of surgery       Family history:  No family history on file.    Social history:   reports that he has never smoked. He has never used smokeless tobacco. He reports current alcohol use. He reports that he does not use drugs.    Health maintenance:  Health Maintenance   Topic Date Due    Yearly Adult Physical  Never done    Hepatitis B Vaccines (2 of 3 - 3-dose series) 09/02/1998    Varicella Vaccines (1 of 2 - 13+ 2-dose series) Never done    Hepatitis A Vaccines (1 of 2 - Risk 2-dose series) Never done    Influenza Vaccine (1) 09/01/2024    COVID-19 Vaccine (3 - 2023-24 season) 09/01/2024    Diabetes: Hemoglobin A1C  05/23/2025    Diabetes Screening  05/23/2027    Lipid Panel  05/23/2029    DTaP/Tdap/Td Vaccines (4 - Td or Tdap) 12/05/2033    Zoster Vaccines (1 of 2) 08/10/2038    MMR Vaccines  Completed    HIV Screening  Completed    Hepatitis C Screening  Completed    HIB Vaccines  Aged Out    IPV Vaccines  Aged Out    Meningococcal Vaccine  Aged Out    Rotavirus Vaccines  Aged Out    HPV Vaccines  Aged Out    Pneumococcal Vaccine: Pediatrics (0 to 5 Years) and At-Risk Patients (6 to 64 Years)  Aged Out    Irritable Bowel Syndrome  Discontinued       Sexual History  Currently Sexually Active: Yes  Partners: Female, wife    Review of systems:  Review of Systems   Constitutional:  Negative for chills and fever.   Respiratory:  Negative for cough and shortness of breath.    Cardiovascular:  Negative for chest pain, palpitations and leg swelling.   Gastrointestinal:  Negative for abdominal  distention, abdominal pain, constipation, diarrhea, nausea and vomiting.   Genitourinary:  Negative for dysuria.   Skin:  Negative for rash and wound.   Neurological:  Positive for headaches. Negative for dizziness, syncope and weakness.        Vitals:  Vitals:    10/09/24 1440   BP: 146/89   Pulse: 89   Temp: 35.6 °C (96.1 °F)   SpO2: 99%       Physical exam:  Physical Exam  Constitutional:       General: He is not in acute distress.     Appearance: He is obese.   HENT:      Head: Normocephalic and atraumatic.   Eyes:      Extraocular Movements: Extraocular movements intact.      Pupils: Pupils are equal, round, and reactive to light.   Cardiovascular:      Rate and Rhythm: Normal rate and regular rhythm.      Heart sounds: Normal heart sounds.   Pulmonary:      Effort: Pulmonary effort is normal. No respiratory distress.      Breath sounds: Normal breath sounds.   Abdominal:      General: Abdomen is flat. There is no distension.      Palpations: Abdomen is soft.      Tenderness: There is no abdominal tenderness.   Musculoskeletal:         General: Normal range of motion.      Cervical back: Normal range of motion.   Skin:     General: Skin is warm and dry.   Neurological:      General: No focal deficit present.      Mental Status: He is alert and oriented to person, place, and time. Mental status is at baseline.   Psychiatric:         Mood and Affect: Mood normal.         Behavior: Behavior normal.         Labs:  Lab Results   Component Value Date    WBC 9.9 07/18/2024    HGB 13.2 (L) 07/18/2024    HCT 42.3 07/18/2024    MCV 76 (L) 07/18/2024     07/18/2024       Lab Results   Component Value Date    GLUCOSE 88 07/18/2024    CALCIUM 9.6 07/18/2024     07/18/2024    K 4.3 07/18/2024    CO2 31 07/18/2024     07/18/2024    BUN 12 07/18/2024    CREATININE 0.83 07/18/2024       Lab Results   Component Value Date    HGBA1C 6.1 (H) 05/23/2024        Lab Results   Component Value Date    CHOL 184  "05/23/2024    CHOL 180 05/23/2024    CHOL 202 (H) 03/05/2020     Lab Results   Component Value Date    HDL 57.2 05/23/2024    HDL 57.8 05/23/2024    HDL 52.9 03/05/2020     Lab Results   Component Value Date    LDLCALC 111 (H) 05/23/2024    LDLCALC 107 (H) 05/23/2024     Lab Results   Component Value Date    TRIG 78 05/23/2024    TRIG 78 05/23/2024    TRIG 97 03/05/2020     No components found for: \"CHOLHDL\"    Imaging:  No results found.    Assessment and plan:  Luis Mcgill is a 34 y.o. male with pmh significant for extreme obesity (follows with bariatric surgery), fibromyalgia, severe depression/anxiety with panic attacks, GERD, metabolic syndrome, GERD, and chronic migraines who presents for coital headaches and persistent migraines.    #Coital Headaches  #Migraines  - ongoing migraines headaches without prophylactic therapy, uses tylenol for abortive  - failed sumatriptan  - main concern is new coital headaches, possibly tension or blood pressure related  - start propranolol 40mg BID  - cont tylenol PRN max 4g daily    #Morbid Obesity  #HTN  #Pre-Diabetes  - Elevated BP in office, does not measure at home, denies hx of HTN  - propranolol started today as above  - Increase Trulicity to 3mg weekly  - Endocrinology referral placed    #MDD; JACINTO  #Hx of Panic attacks  - Stopped taking all preventative medications because none of them have worked and many of them cause weight gain  - Reported topamax and bupropion and venlafaxine all failed  - Only uses hydroxyzine 50mg PRN for anxiety  - Will re-assess at future visit    #GERD  -Continue pantoprazole 40 mg daily     #NICOLE  -On CPAP      HEALTH MAINTENANCE   Antibody Testing   HIV: negative  Syphilis: neg  Hepatitis C: neg   Vaccines  Influenza: due today, doesn't want  Tdap: Received 12/2023. Next due 12/2033   Pneumonia: Received PPSV-23  COVID: encouraged booster  Cancer screening   Colonoscopy: Had C-scope 2022 due to reported history of anal fissures. Exam " was normal. Repeat due in 2032.     Plan     Follow-up in 3 months.    Patient and plan discussed with attending physician Dr. Ross.    Kenny Lopez MD

## 2024-10-09 NOTE — PROGRESS NOTES
Advanced care planning discussed at this visit.  Patient does not currently have a Healthcare Power of  or Living Will in place. He does express that his friend Fabian AustinUzielNano has his permission to act as his surrogate decision maker in the event of an emergency. In addition, the patient as his mother, Irma James, listed as first alternate surrogate decision maker. Patient advised to bring in POA, Living Will and Advanced Care Plan for his chart if he  obtains one.  Patient declined information and documentation on POA and Living Will at this time.

## 2024-10-13 ENCOUNTER — PHARMACY VISIT (OUTPATIENT)
Dept: PHARMACY | Facility: CLINIC | Age: 34
End: 2024-10-13
Payer: MEDICAID

## 2024-10-14 ENCOUNTER — NUTRITION (OUTPATIENT)
Dept: SURGERY | Facility: HOSPITAL | Age: 34
End: 2024-10-14
Payer: COMMERCIAL

## 2024-10-14 DIAGNOSIS — Z98.84 BARIATRIC SURGERY STATUS: ICD-10-CM

## 2024-10-14 DIAGNOSIS — E66.813 CLASS 3 SEVERE OBESITY WITH SERIOUS COMORBIDITY AND BODY MASS INDEX (BMI) OF 60.0 TO 69.9 IN ADULT, UNSPECIFIED OBESITY TYPE: Primary | ICD-10-CM

## 2024-10-14 DIAGNOSIS — E66.01 CLASS 3 SEVERE OBESITY WITH SERIOUS COMORBIDITY AND BODY MASS INDEX (BMI) OF 60.0 TO 69.9 IN ADULT, UNSPECIFIED OBESITY TYPE: Primary | ICD-10-CM

## 2024-10-14 NOTE — PROGRESS NOTES
Bariatric Surgery Program - [facility]    PREOPERATIVE, MULTIDISCIPLINARY, MEDICALLY SUPERVISED, REDUCED CALORIE DIET, BEHAVIOR MODIFICATION AND EXERCISE PROGRAM       S: MSWL 6/6  IW: 420#, LV: 412@    Patient reports ongoing struggle with mental health. Will try to contact therapist today to schedule follow up. Patient reports he has been drinking more water, found SF electrolyte packets he likes and has been doing a good job staying away form pop and coffee. Also reports less ETOH this month, having 2 shots 1 weekend over the last month. Patient states he is focusing on his health. Patient has started going to the gym about 45 minutes/day this past week. Wants to aim for exercise of at least 45 minutes daily. Patient reports he is still working on food choices, trying to eat 3 meals/day.     Exercise: 10 minutes 30 minutes at gym at 4 days/week     O: Wt: 418 lb   Ht:  66 in   BMI: 67.5    Goal: 5% body weight loss over the course of program    Dietary recommendation:   1. Continue to drink 64 ounces of water and sugar free fluids daily. Avoid alcohol and juice.  2. Continue to eat 3 meals/day, balancing each meal with high protein foods.  3. Continue to exercise 40-45 minutes daily. Take rest days when needed and focus on doing yoga/stretching, walking or chair based exercises.  4. Practice your postop behaviors. No drinking with meals and taking small sips of fluids between meals.   5. Look into scheduling an appointment with a counselor or therapist to finds strategies to better manage your depression.         A/P: Pt appears to be making good progress with meeting fluid goal and exercise goal. Patient wants to work on consistency of exercise routine and aim for goal 45 minute/day. Patient will continue to work on avoiding or greatly limiting alcohol. Patient will follow up in 1 month with weight check and diet recall.

## 2024-11-01 ENCOUNTER — OFFICE VISIT (OUTPATIENT)
Dept: PRIMARY CARE | Facility: HOSPITAL | Age: 34
End: 2024-11-01
Payer: COMMERCIAL

## 2024-11-01 VITALS
SYSTOLIC BLOOD PRESSURE: 109 MMHG | BODY MASS INDEX: 50.62 KG/M2 | OXYGEN SATURATION: 97 % | WEIGHT: 315 LBS | HEIGHT: 66 IN | TEMPERATURE: 98.1 F | DIASTOLIC BLOOD PRESSURE: 72 MMHG | HEART RATE: 84 BPM

## 2024-11-01 DIAGNOSIS — F32.A DEPRESSION, UNSPECIFIED DEPRESSION TYPE: ICD-10-CM

## 2024-11-01 DIAGNOSIS — G43.809 OTHER MIGRAINE WITHOUT STATUS MIGRAINOSUS, NOT INTRACTABLE: ICD-10-CM

## 2024-11-01 DIAGNOSIS — E88.810 METABOLIC SYNDROME: ICD-10-CM

## 2024-11-01 DIAGNOSIS — E66.9 EXTREME OBESITY: ICD-10-CM

## 2024-11-01 DIAGNOSIS — R73.03 PREDIABETES: ICD-10-CM

## 2024-11-01 DIAGNOSIS — F31.0 BIPOLAR AFFECTIVE DISORDER, CURRENT EPISODE HYPOMANIC (MULTI): Primary | ICD-10-CM

## 2024-11-01 DIAGNOSIS — M54.41 ACUTE MIDLINE LOW BACK PAIN WITH RIGHT-SIDED SCIATICA: ICD-10-CM

## 2024-11-01 LAB
ALBUMIN SERPL BCP-MCNC: 4.3 G/DL (ref 3.4–5)
ALP SERPL-CCNC: 47 U/L (ref 33–120)
ALT SERPL W P-5'-P-CCNC: 22 U/L (ref 10–52)
AMPHETAMINES UR QL SCN: NORMAL
ANION GAP SERPL CALC-SCNC: 15 MMOL/L (ref 10–20)
AST SERPL W P-5'-P-CCNC: 19 U/L (ref 9–39)
BARBITURATES UR QL SCN: NORMAL
BASOPHILS # BLD AUTO: 0.06 X10*3/UL (ref 0–0.1)
BASOPHILS NFR BLD AUTO: 0.7 %
BENZODIAZ UR QL SCN: NORMAL
BILIRUB SERPL-MCNC: 0.3 MG/DL (ref 0–1.2)
BUN SERPL-MCNC: 12 MG/DL (ref 6–23)
BZE UR QL SCN: NORMAL
CALCIUM SERPL-MCNC: 8.9 MG/DL (ref 8.6–10.6)
CANNABINOIDS UR QL SCN: NORMAL
CHLORIDE SERPL-SCNC: 103 MMOL/L (ref 98–107)
CO2 SERPL-SCNC: 25 MMOL/L (ref 21–32)
CREAT SERPL-MCNC: 0.89 MG/DL (ref 0.5–1.3)
EGFRCR SERPLBLD CKD-EPI 2021: >90 ML/MIN/1.73M*2
EOSINOPHIL # BLD AUTO: 0.24 X10*3/UL (ref 0–0.7)
EOSINOPHIL NFR BLD AUTO: 2.7 %
ERYTHROCYTE [DISTWIDTH] IN BLOOD BY AUTOMATED COUNT: 16.3 % (ref 11.5–14.5)
FENTANYL+NORFENTANYL UR QL SCN: NORMAL
GLUCOSE SERPL-MCNC: 75 MG/DL (ref 74–99)
HCT VFR BLD AUTO: 42.5 % (ref 41–52)
HGB BLD-MCNC: 13.2 G/DL (ref 13.5–17.5)
HIV 1+2 AB+HIV1 P24 AG SERPL QL IA: NONREACTIVE
IMM GRANULOCYTES # BLD AUTO: 0.02 X10*3/UL (ref 0–0.7)
IMM GRANULOCYTES NFR BLD AUTO: 0.2 % (ref 0–0.9)
LYMPHOCYTES # BLD AUTO: 3.1 X10*3/UL (ref 1.2–4.8)
LYMPHOCYTES NFR BLD AUTO: 34.8 %
MCH RBC QN AUTO: 24.2 PG (ref 26–34)
MCHC RBC AUTO-ENTMCNC: 31.1 G/DL (ref 32–36)
MCV RBC AUTO: 78 FL (ref 80–100)
METHADONE UR QL SCN: NORMAL
MONOCYTES # BLD AUTO: 0.61 X10*3/UL (ref 0.1–1)
MONOCYTES NFR BLD AUTO: 6.8 %
NEUTROPHILS # BLD AUTO: 4.88 X10*3/UL (ref 1.2–7.7)
NEUTROPHILS NFR BLD AUTO: 54.8 %
NRBC BLD-RTO: 0 /100 WBCS (ref 0–0)
OPIATES UR QL SCN: NORMAL
OXYCODONE+OXYMORPHONE UR QL SCN: NORMAL
PCP UR QL SCN: NORMAL
PLATELET # BLD AUTO: 391 X10*3/UL (ref 150–450)
POTASSIUM SERPL-SCNC: 4.2 MMOL/L (ref 3.5–5.3)
PROT SERPL-MCNC: 7.9 G/DL (ref 6.4–8.2)
RBC # BLD AUTO: 5.46 X10*6/UL (ref 4.5–5.9)
SODIUM SERPL-SCNC: 139 MMOL/L (ref 136–145)
TREPONEMA PALLIDUM IGG+IGM AB [PRESENCE] IN SERUM OR PLASMA BY IMMUNOASSAY: NONREACTIVE
TSH SERPL-ACNC: 1.23 MIU/L (ref 0.44–3.98)
VIT B12 SERPL-MCNC: 366 PG/ML (ref 211–911)
WBC # BLD AUTO: 8.9 X10*3/UL (ref 4.4–11.3)

## 2024-11-01 PROCEDURE — 85025 COMPLETE CBC W/AUTO DIFF WBC: CPT

## 2024-11-01 PROCEDURE — 80053 COMPREHEN METABOLIC PANEL: CPT

## 2024-11-01 PROCEDURE — 99215 OFFICE O/P EST HI 40 MIN: CPT

## 2024-11-01 PROCEDURE — RXMED WILLOW AMBULATORY MEDICATION CHARGE

## 2024-11-01 PROCEDURE — 3008F BODY MASS INDEX DOCD: CPT

## 2024-11-01 PROCEDURE — 83921 ORGANIC ACID SINGLE QUANT: CPT

## 2024-11-01 PROCEDURE — 99000 SPECIMEN HANDLING OFFICE-LAB: CPT

## 2024-11-01 PROCEDURE — 87389 HIV-1 AG W/HIV-1&-2 AB AG IA: CPT

## 2024-11-01 PROCEDURE — 82607 VITAMIN B-12: CPT

## 2024-11-01 PROCEDURE — 1036F TOBACCO NON-USER: CPT

## 2024-11-01 PROCEDURE — 86780 TREPONEMA PALLIDUM: CPT

## 2024-11-01 PROCEDURE — 80307 DRUG TEST PRSMV CHEM ANLYZR: CPT

## 2024-11-01 PROCEDURE — 36415 COLL VENOUS BLD VENIPUNCTURE: CPT

## 2024-11-01 PROCEDURE — 84443 ASSAY THYROID STIM HORMONE: CPT

## 2024-11-01 PROCEDURE — 99215 OFFICE O/P EST HI 40 MIN: CPT | Mod: GC

## 2024-11-01 RX ORDER — ARIPIPRAZOLE 15 MG/1
15 TABLET ORAL DAILY
Qty: 30 TABLET | Refills: 0 | Status: SHIPPED | OUTPATIENT
Start: 2024-11-01 | End: 2024-12-01

## 2024-11-01 RX ORDER — DULAGLUTIDE 3 MG/.5ML
3 INJECTION, SOLUTION SUBCUTANEOUS
Qty: 2 ML | Refills: 2 | Status: SHIPPED | OUTPATIENT
Start: 2024-11-03 | End: 2025-01-26

## 2024-11-01 RX ORDER — HYDROXYZINE HYDROCHLORIDE 25 MG/1
50 TABLET, FILM COATED ORAL EVERY 6 HOURS PRN
Qty: 15 TABLET | Refills: 3 | Status: SHIPPED | OUTPATIENT
Start: 2024-11-01

## 2024-11-01 ASSESSMENT — PATIENT HEALTH QUESTIONNAIRE - PHQ9
7. TROUBLE CONCENTRATING ON THINGS, SUCH AS READING THE NEWSPAPER OR WATCHING TELEVISION: NEARLY EVERY DAY
8. MOVING OR SPEAKING SO SLOWLY THAT OTHER PEOPLE COULD HAVE NOTICED. OR THE OPPOSITE, BEING SO FIGETY OR RESTLESS THAT YOU HAVE BEEN MOVING AROUND A LOT MORE THAN USUAL: NEARLY EVERY DAY
3. TROUBLE FALLING OR STAYING ASLEEP OR SLEEPING TOO MUCH: NEARLY EVERY DAY
SUM OF ALL RESPONSES TO PHQ QUESTIONS 1-9: 27
4. FEELING TIRED OR HAVING LITTLE ENERGY: NEARLY EVERY DAY
6. FEELING BAD ABOUT YOURSELF - OR THAT YOU ARE A FAILURE OR HAVE LET YOURSELF OR YOUR FAMILY DOWN: NEARLY EVERY DAY
5. POOR APPETITE OR OVEREATING: NEARLY EVERY DAY
9. THOUGHTS THAT YOU WOULD BE BETTER OFF DEAD, OR OF HURTING YOURSELF: NEARLY EVERY DAY
2. FEELING DOWN, DEPRESSED OR HOPELESS: NEARLY EVERY DAY
SUM OF ALL RESPONSES TO PHQ9 QUESTIONS 1 AND 2: 6
1. LITTLE INTEREST OR PLEASURE IN DOING THINGS: NEARLY EVERY DAY
10. IF YOU CHECKED OFF ANY PROBLEMS, HOW DIFFICULT HAVE THESE PROBLEMS MADE IT FOR YOU TO DO YOUR WORK, TAKE CARE OF THINGS AT HOME, OR GET ALONG WITH OTHER PEOPLE: VERY DIFFICULT

## 2024-11-01 ASSESSMENT — ENCOUNTER SYMPTOMS
OCCASIONAL FEELINGS OF UNSTEADINESS: 0
ACTIVITY CHANGE: 1
HYPERACTIVE: 1
LOSS OF SENSATION IN FEET: 1
DEPRESSION: 1
HALLUCINATIONS: 0
SLEEP DISTURBANCE: 1
AGITATION: 0
SHORTNESS OF BREATH: 0

## 2024-11-01 ASSESSMENT — COLUMBIA-SUICIDE SEVERITY RATING SCALE - C-SSRS
2. HAVE YOU ACTUALLY HAD ANY THOUGHTS OF KILLING YOURSELF?: YES
4. HAVE YOU HAD THESE THOUGHTS AND HAD SOME INTENTION OF ACTING ON THEM?: NO
6. HAVE YOU EVER DONE ANYTHING, STARTED TO DO ANYTHING, OR PREPARED TO DO ANYTHING TO END YOUR LIFE?: NO
6. HAVE YOU EVER DONE ANYTHING, STARTED TO DO ANYTHING, OR PREPARED TO DO ANYTHING TO END YOUR LIFE?: NO
5. HAVE YOU STARTED TO WORK OUT OR WORKED OUT THE DETAILS OF HOW TO KILL YOURSELF? DO YOU INTEND TO CARRY OUT THIS PLAN?: NO
1. IN THE PAST MONTH, HAVE YOU WISHED YOU WERE DEAD OR WISHED YOU COULD GO TO SLEEP AND NOT WAKE UP?: YES

## 2024-11-01 ASSESSMENT — PAIN SCALES - GENERAL: PAINLEVEL_OUTOF10: 10-WORST PAIN EVER

## 2024-11-04 ENCOUNTER — TELEPHONE (OUTPATIENT)
Dept: PRIMARY CARE | Facility: HOSPITAL | Age: 34
End: 2024-11-04
Payer: COMMERCIAL

## 2024-11-04 NOTE — TELEPHONE ENCOUNTER
Patient was seen on 11/1 and had labs drawn. Received a call from the Lab today stating the labs was canceled

## 2024-11-06 LAB — METHYLMALONATE SERPL-SCNC: 0.1 UMOL/L (ref 0–0.4)

## 2024-11-07 ENCOUNTER — PHARMACY VISIT (OUTPATIENT)
Dept: PHARMACY | Facility: CLINIC | Age: 34
End: 2024-11-07
Payer: MEDICAID

## 2024-11-14 ENCOUNTER — NUTRITION (OUTPATIENT)
Dept: SURGERY | Facility: CLINIC | Age: 34
End: 2024-11-14
Payer: COMMERCIAL

## 2024-11-14 DIAGNOSIS — Z98.84 BARIATRIC SURGERY STATUS: ICD-10-CM

## 2024-11-14 DIAGNOSIS — E66.813 CLASS 3 SEVERE OBESITY WITH SERIOUS COMORBIDITY AND BODY MASS INDEX (BMI) OF 60.0 TO 69.9 IN ADULT, UNSPECIFIED OBESITY TYPE: Primary | ICD-10-CM

## 2024-11-14 DIAGNOSIS — E66.01 CLASS 3 SEVERE OBESITY WITH SERIOUS COMORBIDITY AND BODY MASS INDEX (BMI) OF 60.0 TO 69.9 IN ADULT, UNSPECIFIED OBESITY TYPE: Primary | ICD-10-CM

## 2024-11-14 NOTE — PROGRESS NOTES
Bariatric Surgery Program - [facility]    PREOPERATIVE, MULTIDISCIPLINARY, MEDICALLY SUPERVISED, REDUCED CALORIE DIET, BEHAVIOR MODIFICATION AND EXERCISE PROGRAM       S: IW: 420#  Patient reports he's been eating more vegetables and fruits. Has been making smoothies for breakfast and adding whey powder. Has a salad at lunch and dinner and adds lean proteins and some beans most of the time. Patient is following a 10 day cleanse and has 5 more days. Plans to continue following the plan which includes vegetables at all meals and will be sure to add a high fiber starch and lean protein. Patient is eating 3 meals/day and limits snacking. Patient reports drinking more water, no alcohol and/or juice. Will have Gatorade zero. Reports practicing 30's rule. Patient is doing well with exercise routine and is noticing increase with strength and endurance.     Exercise: going to gym 1 hour in the morning     O: Wt: 410 lb   Ht:  66 in   BMI: 66.22    Goal: 5% body weight loss over the course of program    Dietary recommendation:   1. Continue to drink 64 ounces of water and sugar free fluids daily. Avoid alcohol and juice.  2. Continue to eat 3 meals/day, balancing each meal with high protein foods.  3. Continue your exercise routine. Consistency is key!  4. Practice your postop behaviors. No drinking with meals and taking small sips of fluids between meals.   5. Take a multivitamin daily.         A/P:  Pt appears to be making good progress with meeting fluid, meal plan, and exercise goals. He is motivated by his increased energy levels and weight loss of 8# this month. Patient will follow up in 1 month to ensure consistency with regimen.

## 2024-11-19 ENCOUNTER — DOCUMENTATION (OUTPATIENT)
Dept: SURGERY | Facility: HOSPITAL | Age: 34
End: 2024-11-19
Payer: COMMERCIAL

## 2024-11-19 NOTE — PROGRESS NOTES
"Pt was scheduled to come in to clinic. Patient no showed. Attempted to call the pt and there was no answer, and it advised that the pt's \"vcml has not been setup, goodbye.\"  "

## 2024-12-12 ENCOUNTER — APPOINTMENT (OUTPATIENT)
Dept: SURGERY | Facility: CLINIC | Age: 34
End: 2024-12-12
Payer: COMMERCIAL

## 2024-12-16 ENCOUNTER — OFFICE VISIT (OUTPATIENT)
Dept: PRIMARY CARE | Facility: HOSPITAL | Age: 34
End: 2024-12-16
Payer: COMMERCIAL

## 2024-12-16 VITALS
HEART RATE: 85 BPM | DIASTOLIC BLOOD PRESSURE: 83 MMHG | OXYGEN SATURATION: 96 % | SYSTOLIC BLOOD PRESSURE: 127 MMHG | HEIGHT: 66 IN | WEIGHT: 315 LBS | BODY MASS INDEX: 50.62 KG/M2 | TEMPERATURE: 97.5 F

## 2024-12-16 DIAGNOSIS — M54.2 NECK PAIN: ICD-10-CM

## 2024-12-16 DIAGNOSIS — K21.9 GASTROESOPHAGEAL REFLUX DISEASE WITHOUT ESOPHAGITIS: ICD-10-CM

## 2024-12-16 DIAGNOSIS — R10.84 GENERALIZED ABDOMINAL PAIN: ICD-10-CM

## 2024-12-16 DIAGNOSIS — Z00.01 ANNUAL VISIT FOR GENERAL ADULT MEDICAL EXAMINATION WITH ABNORMAL FINDINGS: Primary | ICD-10-CM

## 2024-12-16 DIAGNOSIS — L73.2 HIDRADENITIS: ICD-10-CM

## 2024-12-16 LAB
ALBUMIN SERPL BCP-MCNC: 4.2 G/DL (ref 3.4–5)
ALP SERPL-CCNC: 57 U/L (ref 33–120)
ALT SERPL W P-5'-P-CCNC: 18 U/L (ref 10–52)
AMYLASE SERPL-CCNC: 49 U/L (ref 29–103)
ANION GAP SERPL CALC-SCNC: 9 MMOL/L (ref 10–20)
AST SERPL W P-5'-P-CCNC: 20 U/L (ref 9–39)
BASOPHILS # BLD AUTO: 0.07 X10*3/UL (ref 0–0.1)
BASOPHILS NFR BLD AUTO: 0.8 %
BILIRUB SERPL-MCNC: 0.5 MG/DL (ref 0–1.2)
BUN SERPL-MCNC: 11 MG/DL (ref 6–23)
CALCIUM SERPL-MCNC: 9.7 MG/DL (ref 8.6–10.6)
CHLORIDE SERPL-SCNC: 104 MMOL/L (ref 98–107)
CO2 SERPL-SCNC: 31 MMOL/L (ref 21–32)
CREAT SERPL-MCNC: 0.94 MG/DL (ref 0.5–1.3)
EGFRCR SERPLBLD CKD-EPI 2021: >90 ML/MIN/1.73M*2
EOSINOPHIL # BLD AUTO: 0.25 X10*3/UL (ref 0–0.7)
EOSINOPHIL NFR BLD AUTO: 2.8 %
ERYTHROCYTE [DISTWIDTH] IN BLOOD BY AUTOMATED COUNT: 16.8 % (ref 11.5–14.5)
GLUCOSE SERPL-MCNC: 95 MG/DL (ref 74–99)
HCT VFR BLD AUTO: 41.7 % (ref 41–52)
HGB BLD-MCNC: 12.8 G/DL (ref 13.5–17.5)
HIV 1+2 AB+HIV1 P24 AG SERPL QL IA: NONREACTIVE
IMM GRANULOCYTES # BLD AUTO: 0.03 X10*3/UL (ref 0–0.7)
IMM GRANULOCYTES NFR BLD AUTO: 0.3 % (ref 0–0.9)
LIPASE SERPL-CCNC: 39 U/L (ref 9–82)
LYMPHOCYTES # BLD AUTO: 3.02 X10*3/UL (ref 1.2–4.8)
LYMPHOCYTES NFR BLD AUTO: 33.4 %
MCH RBC QN AUTO: 23.8 PG (ref 26–34)
MCHC RBC AUTO-ENTMCNC: 30.7 G/DL (ref 32–36)
MCV RBC AUTO: 78 FL (ref 80–100)
MONOCYTES # BLD AUTO: 0.56 X10*3/UL (ref 0.1–1)
MONOCYTES NFR BLD AUTO: 6.2 %
NEUTROPHILS # BLD AUTO: 5.11 X10*3/UL (ref 1.2–7.7)
NEUTROPHILS NFR BLD AUTO: 56.5 %
NRBC BLD-RTO: 0 /100 WBCS (ref 0–0)
PLATELET # BLD AUTO: 381 X10*3/UL (ref 150–450)
POTASSIUM SERPL-SCNC: 4.3 MMOL/L (ref 3.5–5.3)
PROT SERPL-MCNC: 8.1 G/DL (ref 6.4–8.2)
RBC # BLD AUTO: 5.37 X10*6/UL (ref 4.5–5.9)
SODIUM SERPL-SCNC: 140 MMOL/L (ref 136–145)
WBC # BLD AUTO: 9 X10*3/UL (ref 4.4–11.3)

## 2024-12-16 PROCEDURE — 83690 ASSAY OF LIPASE: CPT

## 2024-12-16 PROCEDURE — 85025 COMPLETE CBC W/AUTO DIFF WBC: CPT

## 2024-12-16 PROCEDURE — 36415 COLL VENOUS BLD VENIPUNCTURE: CPT

## 2024-12-16 PROCEDURE — 82150 ASSAY OF AMYLASE: CPT

## 2024-12-16 PROCEDURE — 84075 ASSAY ALKALINE PHOSPHATASE: CPT

## 2024-12-16 PROCEDURE — 87389 HIV-1 AG W/HIV-1&-2 AB AG IA: CPT

## 2024-12-16 PROCEDURE — 87591 N.GONORRHOEAE DNA AMP PROB: CPT

## 2024-12-16 RX ORDER — CELECOXIB 200 MG/1
200 CAPSULE ORAL 2 TIMES DAILY
Qty: 60 CAPSULE | Refills: 5 | Status: SHIPPED | OUTPATIENT
Start: 2024-12-16 | End: 2025-06-14

## 2024-12-16 RX ORDER — DICLOFENAC SODIUM 10 MG/G
4 GEL TOPICAL 4 TIMES DAILY
Qty: 100 G | Refills: 1 | Status: SHIPPED | OUTPATIENT
Start: 2024-12-16

## 2024-12-16 RX ORDER — LIDOCAINE AND PRILOCAINE 25; 25 MG/G; MG/G
CREAM TOPICAL DAILY PRN
Qty: 20 G | Refills: 2 | Status: SHIPPED | OUTPATIENT
Start: 2024-12-16

## 2024-12-16 RX ORDER — FAMOTIDINE 20 MG/1
20 TABLET, FILM COATED ORAL 2 TIMES DAILY
Qty: 60 TABLET | Refills: 5 | Status: SHIPPED | OUTPATIENT
Start: 2024-12-16 | End: 2024-12-19 | Stop reason: WASHOUT

## 2024-12-16 ASSESSMENT — PAIN SCALES - GENERAL: PAINLEVEL_OUTOF10: 10-WORST PAIN EVER

## 2024-12-16 ASSESSMENT — PATIENT HEALTH QUESTIONNAIRE - PHQ9
2. FEELING DOWN, DEPRESSED OR HOPELESS: NOT AT ALL
10. IF YOU CHECKED OFF ANY PROBLEMS, HOW DIFFICULT HAVE THESE PROBLEMS MADE IT FOR YOU TO DO YOUR WORK, TAKE CARE OF THINGS AT HOME, OR GET ALONG WITH OTHER PEOPLE: EXTREMELY DIFFICULT
1. LITTLE INTEREST OR PLEASURE IN DOING THINGS: NOT AT ALL
SUM OF ALL RESPONSES TO PHQ9 QUESTIONS 1 AND 2: 6
2. FEELING DOWN, DEPRESSED OR HOPELESS: NEARLY EVERY DAY
1. LITTLE INTEREST OR PLEASURE IN DOING THINGS: NEARLY EVERY DAY
SUM OF ALL RESPONSES TO PHQ9 QUESTIONS 1 AND 2: 0

## 2024-12-16 ASSESSMENT — ENCOUNTER SYMPTOMS
OCCASIONAL FEELINGS OF UNSTEADINESS: 0
LOSS OF SENSATION IN FEET: 0
DEPRESSION: 1

## 2024-12-16 NOTE — PROGRESS NOTES
Chief complaint:  Abdominal pains   HPI:  yesenia Mcgill is a 34 y.o. male with pmh significant for extreme obesity (follows with bariatric surgery), fibromyalgia, severe depression/anxiety with panic attacks, GERD, metabolic syndrome, and chronic migraines came for assessment of abdominal pain.     He report that his pain started like 1 month ago after increasing his Trulicity to 3 mg weekly, however he was on that dose for 1 and half months.     He report that pain sometimes radiate to the back and its diffuse and located to specific location in the abdomen.     He denied any changes in Bowel habits, he report that its mainly semisolid. He denied any hx of vomiting. He said he was diagnosed with GERD and he was noted to not respond to any medications for GERD.     He mentioned that he took ozempic? Like one week prior to this visit even while he have the pain.     He report the pain to be severe and constant but deny that it affected his sleep or feeding.     He have hx of alcohol use and he have 4 shots everyday for the last 4 days, usually he have 4 shots every week.     He denied hx of STDs but he reported that he wanted to be tested.       Medications:  Current Outpatient Medications   Medication Instructions    acetaminophen (TYLENOL) 650 mg, oral, Every 6 hours PRN    ARIPiprazole (ABILIFY) 15 mg, oral, Daily    celecoxib (CELEBREX) 200 mg, oral, 2 times daily    diclofenac sodium (VOLTAREN) 4 g, Topical, 4 times daily    famotidine (PEPCID) 20 mg, oral, 2 times daily    hydrOXYzine HCL (ATARAX) 50 mg, oral, Every 6 hours PRN    lidocaine-prilocaine (Emla) 2.5-2.5 % cream Topical, Daily PRN    propranolol (INDERAL) 40 mg, oral, 2 times daily    Trulicity 3 mg, subcutaneous, Once Weekly       Allergies:  Allergies   Allergen Reactions    Amitriptyline Other     priapism    Amoxicillin Unknown    Citalopram Other     priapism    Pregabalin Other     priapism       Past medical history:  Past Medical  History:   Diagnosis Date    Migraine     Priapism, unspecified 03/13/2015    Priapism       Surgical history:  Past Surgical History:   Procedure Laterality Date    OTHER SURGICAL HISTORY  02/11/2020    No history of surgery       Family history:  No family history on file.    Social history:   reports that he has never smoked. He has never used smokeless tobacco. He reports current alcohol use. He reports that he does not use drugs.    Health maintenance:  Health Maintenance   Topic Date Due    Yearly Adult Physical  Never done    Hepatitis B Vaccines (2 of 3 - 3-dose series) 09/02/1998    Varicella Vaccines (1 of 2 - 13+ 2-dose series) Never done    Hepatitis A Vaccines (1 of 2 - Risk 2-dose series) Never done    Influenza Vaccine (1) 09/01/2024    COVID-19 Vaccine (3 - 2024-25 season) 09/01/2024    Diabetes: Hemoglobin A1C  05/23/2025    Diabetes Screening  05/23/2027    Lipid Panel  05/23/2029    DTaP/Tdap/Td Vaccines (4 - Td or Tdap) 12/05/2033    Zoster Vaccines (1 of 2) 08/10/2038    MMR Vaccines  Completed    HIV Screening  Completed    Hepatitis C Screening  Completed    HIB Vaccines  Aged Out    IPV Vaccines  Aged Out    Meningococcal Vaccine  Aged Out    Rotavirus Vaccines  Aged Out    HPV Vaccines  Aged Out    Pneumococcal Vaccine: Pediatrics (0 to 5 Years) and At-Risk Patients (6 to 64 Years)  Aged Out    Irritable Bowel Syndrome  Discontinued           Vitals:  Vitals:    12/16/24 1309   BP: 127/83   Pulse: 85   Temp: 36.4 °C (97.5 °F)   SpO2: 96%       Physical exam:  General: well-appearing, NAD, lying in bed comfortably.  Neuro: AOx4, cooperative, moves all extremities spontaneously. CN II-XII intact. UE and LE strength 5/5 bilaterally and sensation intact.  HEENT: NC/AT. MMM. No oral exudate.  Respiratory: CTA bilaterally. Normal respiratory effort. On RA  CV: RRR. No murmurs. No JVD. Radial pulses 2+. No LE edema bilaterally.  Abdomen: Distended with diffuse tenderness, there was noted active HS  "lesions ( over the LUQ )         Labs:  Lab Results   Component Value Date    WBC 8.9 11/01/2024    HGB 13.2 (L) 11/01/2024    HCT 42.5 11/01/2024    MCV 78 (L) 11/01/2024     11/01/2024       Lab Results   Component Value Date    GLUCOSE 75 11/01/2024    CALCIUM 8.9 11/01/2024     11/01/2024    K 4.2 11/01/2024    CO2 25 11/01/2024     11/01/2024    BUN 12 11/01/2024    CREATININE 0.89 11/01/2024       Lab Results   Component Value Date    HGBA1C 6.1 (H) 05/23/2024        Lab Results   Component Value Date    CHOL 184 05/23/2024    CHOL 180 05/23/2024    CHOL 202 (H) 03/05/2020     Lab Results   Component Value Date    HDL 57.2 05/23/2024    HDL 57.8 05/23/2024    HDL 52.9 03/05/2020     Lab Results   Component Value Date    LDLCALC 111 (H) 05/23/2024    LDLCALC 107 (H) 05/23/2024     Lab Results   Component Value Date    TRIG 78 05/23/2024    TRIG 78 05/23/2024    TRIG 97 03/05/2020     No components found for: \"CHOLHDL\"    Imaging:      Assessment and plan:  Luis Mcgill is a 34 y.o. male with pmh significant for extreme obesity (follows with bariatric surgery), fibromyalgia, severe depression/anxiety with panic attacks, GERD, metabolic syndrome, and chronic migraines who presents with abdominal pain. He report that his pain started like 1 month ago after increasing his Trulicity to 3 mg weekly, however he was on that dose for 1 and half months. He report that pain sometimes radiate to the back and its diffuse sharp and located to specific location in the abdomen.           #Abdominal pain likely related to GERD like symptoms vs gastroparesis , less likely to be Pancreatitis or GB related disease for investigations:   - His Hx is vague and he denied any vomiting.   - He is at high risk of GB disease and pancreatitis as well.   - He denied any constipation but he does report hx of anal fissures.     Plan:   - Send labs work now ( with CMP CBC and Lipase levels)   - Pain meds as needed   - " [place] Cream for abdominal tenderness around the HS lesions   - If all labs are negative, we might consider CT abdomen.       #Hidradenitis suppurativa active lesions in the LUQ of abdomen noted in the skin folds.   - He used to follow with Derm.   - He is not currently on meds.   - Never used biologics and he report hx of surgical interventions, he denied any hx of inguinal HS lesions but report hx of axillary HS, Not active during this visit today.   Plan:   - refer to Dermatology     #Bipolar 1 Disorder, Acute Hypomanic vs Manic Episode  #No Psychotic Features  #No Suicidal/Homicidal Ideation  #Insomnia  - Started on Aripiprazole 15mg once daily  - He did not see a psychiatrist yet, planned for next week.        #JACINTO  #Hx of Panic attacks  - Stopped taking all preventative medications because none of them have worked and many of them cause weight gain  - Only uses hydroxyzine 50mg PRN for anxiety     #Coital Headaches, resolved  #Migraines  - failed sumatriptan  - main concern was new coital headaches, possibly tension or blood pressure related  - started on propranolol 40mg BID last visit, pt never started it  - headache resolved by meditation and yoga prior to intercourse per pt  - cont tylenol PRN max 4g daily     #Morbid Obesity  #Pre-Diabetes  - BP today 109/72  - C/w Trulicity to 3mg weekly, currently off due to the pains.   - Endocrinology referral placed previous visit     #GERD  -Continue pantoprazole 40 mg daily     #NICOLE  -On CPAP    Plan    Follow-up in 3 month.    Alphonse Guthrie MD

## 2024-12-16 NOTE — PATIENT INSTRUCTIONS
As discussed today, our plan is:     Labs - we collected labs today and will call you with any abnormal results. If you have any questions or concerns prior to us calling feel free to call our office to have your questions addressed.   Medication changes: pain meds ( Celecoxib ) ( Haylee cream for pain) ( Famotidine )   Consultations - you were referred to see the following specialists: Dermatology You should receive a call from central scheduling in the next few days if you do not receive a call within 3-5 business days please call [phone] to schedule your appointment.   4.   If you smoke or use other tobacco products, take steps to quit. Call [phone] for more information or to set up an appointment with  [facility]. The [place] Tobacco Quit Line is a free resource for people who don’t have insurance, receive Medicaid, pregnant women, or members of the [place] [facility]. Call 5-803-QUIT-NOW or 1-832.760.2509.    Please come back to see us in: RTC 3 months   ------  If you have any problems or questions, please contact the clinic at [phone] to leave a message. Our fax number is [phone]. If your issue cannot wait until the next business day, please go to urgent care or the emergency department.     I also strongly urge all of my patients to register for [facility] by going to: [email or web address]  (The  staff can also send you a text/email link to register when you check out).    No shows: It is understandable if you are unable to make it to a visit, but please cancel your appointment instead of not showing up. This helps to give other patients access to primary care and keeps wait times low.        [facility]   [phone]

## 2024-12-17 DIAGNOSIS — R10.84 GENERALIZED ABDOMINAL PAIN: Primary | ICD-10-CM

## 2024-12-17 LAB
C TRACH RRNA SPEC QL NAA+PROBE: NEGATIVE
N GONORRHOEA DNA SPEC QL PROBE+SIG AMP: NEGATIVE

## 2024-12-17 NOTE — RESULT ENCOUNTER NOTE
Reviewed the results.   Labs work seems just indicative of LIUDMILA.   RFP and LFT are WNL.  Lipase is low.    We will do CT Abdomen

## 2024-12-19 ENCOUNTER — HOSPITAL ENCOUNTER (EMERGENCY)
Facility: HOSPITAL | Age: 34
Discharge: HOME | End: 2024-12-19
Attending: EMERGENCY MEDICINE
Payer: COMMERCIAL

## 2024-12-19 VITALS
SYSTOLIC BLOOD PRESSURE: 130 MMHG | DIASTOLIC BLOOD PRESSURE: 84 MMHG | WEIGHT: 315 LBS | HEART RATE: 71 BPM | HEIGHT: 66 IN | BODY MASS INDEX: 50.62 KG/M2 | RESPIRATION RATE: 16 BRPM | OXYGEN SATURATION: 100 % | TEMPERATURE: 97.9 F

## 2024-12-19 DIAGNOSIS — K21.9 GASTROESOPHAGEAL REFLUX DISEASE WITHOUT ESOPHAGITIS: ICD-10-CM

## 2024-12-19 DIAGNOSIS — R10.84 GENERALIZED ABDOMINAL PAIN: Primary | ICD-10-CM

## 2024-12-19 LAB
APPEARANCE UR: CLEAR
BILIRUB UR STRIP.AUTO-MCNC: NEGATIVE MG/DL
COLOR UR: NORMAL
GLUCOSE UR STRIP.AUTO-MCNC: NORMAL MG/DL
HOLD SPECIMEN: NORMAL
KETONES UR STRIP.AUTO-MCNC: NEGATIVE MG/DL
LEUKOCYTE ESTERASE UR QL STRIP.AUTO: NEGATIVE
NITRITE UR QL STRIP.AUTO: NEGATIVE
PH UR STRIP.AUTO: 6 [PH]
PROT UR STRIP.AUTO-MCNC: NEGATIVE MG/DL
RBC # UR STRIP.AUTO: NEGATIVE /UL
SP GR UR STRIP.AUTO: 1.02
UROBILINOGEN UR STRIP.AUTO-MCNC: NORMAL MG/DL

## 2024-12-19 PROCEDURE — 2500000004 HC RX 250 GENERAL PHARMACY W/ HCPCS (ALT 636 FOR OP/ED): Mod: SE

## 2024-12-19 PROCEDURE — 99284 EMERGENCY DEPT VISIT MOD MDM: CPT | Performed by: EMERGENCY MEDICINE

## 2024-12-19 PROCEDURE — 2500000005 HC RX 250 GENERAL PHARMACY W/O HCPCS: Mod: SE

## 2024-12-19 PROCEDURE — 2500000002 HC RX 250 W HCPCS SELF ADMINISTERED DRUGS (ALT 637 FOR MEDICARE OP, ALT 636 FOR OP/ED): Mod: SE

## 2024-12-19 PROCEDURE — 96372 THER/PROPH/DIAG INJ SC/IM: CPT

## 2024-12-19 PROCEDURE — 2500000001 HC RX 250 WO HCPCS SELF ADMINISTERED DRUGS (ALT 637 FOR MEDICARE OP): Mod: SE

## 2024-12-19 PROCEDURE — 81003 URINALYSIS AUTO W/O SCOPE: CPT

## 2024-12-19 RX ORDER — DICYCLOMINE HYDROCHLORIDE 20 MG/1
20 TABLET ORAL
Qty: 30 TABLET | Refills: 0 | Status: SHIPPED | OUTPATIENT
Start: 2024-12-19

## 2024-12-19 RX ORDER — ALUMINUM HYDROXIDE, MAGNESIUM HYDROXIDE, AND SIMETHICONE 1200; 120; 1200 MG/30ML; MG/30ML; MG/30ML
10 SUSPENSION ORAL EVERY 6 HOURS PRN
Qty: 355 ML | Refills: 0 | Status: SHIPPED | OUTPATIENT
Start: 2024-12-19 | End: 2024-12-29

## 2024-12-19 RX ORDER — PANTOPRAZOLE SODIUM 20 MG/1
20 TABLET, DELAYED RELEASE ORAL DAILY
Qty: 20 TABLET | Refills: 0 | Status: SHIPPED | OUTPATIENT
Start: 2024-12-19 | End: 2025-01-08

## 2024-12-19 RX ORDER — DICYCLOMINE HYDROCHLORIDE 10 MG/ML
20 INJECTION INTRAMUSCULAR ONCE
Status: COMPLETED | OUTPATIENT
Start: 2024-12-19 | End: 2024-12-19

## 2024-12-19 RX ORDER — FAMOTIDINE 20 MG/1
20 TABLET, FILM COATED ORAL DAILY
Qty: 30 TABLET | Refills: 0 | Status: SHIPPED | OUTPATIENT
Start: 2024-12-19 | End: 2025-01-18

## 2024-12-19 RX ORDER — PANTOPRAZOLE SODIUM 20 MG/1
20 TABLET, DELAYED RELEASE ORAL
Status: DISCONTINUED | OUTPATIENT
Start: 2024-12-19 | End: 2024-12-19 | Stop reason: HOSPADM

## 2024-12-19 RX ORDER — ALUMINUM HYDROXIDE, MAGNESIUM HYDROXIDE, AND SIMETHICONE 1200; 120; 1200 MG/30ML; MG/30ML; MG/30ML
30 SUSPENSION ORAL ONCE
Status: COMPLETED | OUTPATIENT
Start: 2024-12-19 | End: 2024-12-19

## 2024-12-19 RX ORDER — FAMOTIDINE 20 MG/1
20 TABLET, FILM COATED ORAL ONCE
Status: COMPLETED | OUTPATIENT
Start: 2024-12-19 | End: 2024-12-19

## 2024-12-19 RX ORDER — ONDANSETRON 4 MG/1
4 TABLET, ORALLY DISINTEGRATING ORAL ONCE
Status: COMPLETED | OUTPATIENT
Start: 2024-12-19 | End: 2024-12-19

## 2024-12-19 RX ORDER — LIDOCAINE HYDROCHLORIDE 20 MG/ML
15 SOLUTION OROPHARYNGEAL ONCE
Status: COMPLETED | OUTPATIENT
Start: 2024-12-19 | End: 2024-12-19

## 2024-12-19 RX ORDER — PANTOPRAZOLE SODIUM 20 MG/1
20 TABLET, DELAYED RELEASE ORAL
Status: DISCONTINUED | OUTPATIENT
Start: 2024-12-19 | End: 2024-12-19

## 2024-12-19 ASSESSMENT — PAIN DESCRIPTION - ONSET
ONSET: ONGOING
ONSET: ONGOING

## 2024-12-19 ASSESSMENT — PAIN SCALES - GENERAL
PAINLEVEL_OUTOF10: 10 - WORST POSSIBLE PAIN
PAINLEVEL_OUTOF10: 7

## 2024-12-19 ASSESSMENT — PAIN DESCRIPTION - PAIN TYPE
TYPE: ACUTE PAIN
TYPE: ACUTE PAIN

## 2024-12-19 ASSESSMENT — PAIN - FUNCTIONAL ASSESSMENT
PAIN_FUNCTIONAL_ASSESSMENT: 0-10
PAIN_FUNCTIONAL_ASSESSMENT: 0-10

## 2024-12-19 ASSESSMENT — PAIN DESCRIPTION - DESCRIPTORS: DESCRIPTORS: ACHING

## 2024-12-19 ASSESSMENT — PAIN DESCRIPTION - FREQUENCY
FREQUENCY: CONSTANT/CONTINUOUS
FREQUENCY: INTERMITTENT

## 2024-12-19 ASSESSMENT — PAIN DESCRIPTION - LOCATION
LOCATION: ABDOMEN
LOCATION: ABDOMEN

## 2024-12-19 ASSESSMENT — PAIN DESCRIPTION - PROGRESSION
CLINICAL_PROGRESSION: GRADUALLY WORSENING
CLINICAL_PROGRESSION: GRADUALLY WORSENING

## 2024-12-19 NOTE — ED PROVIDER NOTES
Emergency Department Provider Note        History of Present Illness     History provided by: Patient  Limitations to History: None  External Records Reviewed with Brief Summary:  Prior primary care visit note regarding patient's abdominal pain, plan for lab work which was sent on 12/16/2024    HPI:  Luis Mcgill is a 34 y.o. male with prior history of prediabetes, morbid obesity, GERD, and abdominal pain presenting to the emergency department with persistent abdominal pain.  Patient states that he has had this pain for 2 months with no improvement.  He was recently seen at his primary care office, they had ordered labs at that time which were overall unremarkable.  Noted that he is scheduled to have an ultrasound in in the next few days as well, however the pain was progressively worse at home with intermittent nausea so he presented to the emergency department today.  He has not had any fevers, chills, episodes of emesis, diarrhea, constipation, melena, bright red blood with stool, or any hematuria or dysuria.  Declines any notable changes in character of the abdominal pain or last 2 months.  Last bowel movement was yesterday and was soft but otherwise normal bowel movement for patient.    Physical Exam   Triage vitals:  T 36.6 °C (97.9 °F)  HR 71  /84  RR 16  O2 100 % None (Room air)    GEN:  A&Ox3, no acute distress, appears comfortable. Conversational and appropriate.    HEENT: Normocephalic, atraumatic. Conjunctiva pink with no redness or exudates. Hearing grossly intact. Moist mucous membranes.  CARDIO: Normal rate and regular rhythm. Normal S1, S2  without murmurs, rubs, or gallops.   PULM: Clear to auscultation bilaterally. No rales, rhonchi, or wheezes. No accessory muscle use or stridor.  GI: Soft, diffuse abdominal tenderness to palpation, worst in the epigastric area, non-distended. No rebound tenderness or guarding.   SKIN: Warm and dry, no rashes, lesions, petechiae, or purpura.  MSK:  ROM intact in all 4 extremities without contractures or pain. No peripheral edema, contusions, or wounds.    NEURO: No focal findings identified. No confusion or gross mental status changes.  PSYCH: Appropriate mood and behavior, converses and responds appropriately during exam.    Medical Decision Making & ED Course   Medical Decision Makin y.o. male with prior history of prediabetes and morbid obesity on Trulicity, and GERD presenting to the emergency department with persistent abdominal pain.  He was evaluated at his primary care who performed lab work which were entirely unremarkable including normal CMP, lipase, CBC, glucose, HIV, and chlamydia and gonorrhea testing.  He overall is well-appearing without any red flag symptoms, no bright red blood with bowel movements, melena, inability to tolerate p.o. intake, prior abdominal surgeries or any history of cancer.  While here, urinalysis was performed to rule out potential UTI, and was unremarkable.  At this time, I do not think patient would benefit from CT imaging given his overall reassuring exam and prior reassuring lab work in addition to vital signs which are also reassuring.  He was given Maalox, viscous lidocaine, Zofran, Bentyl, famotidine, and pantoprazole for his symptoms with some improvement.  Overall I do believe his symptoms are likely related to GERD versus peptic ulcer disease.  I discussed this with patient, admitted that he was not taking his pantoprazole.  Will give patient prescription for Bentyl, Maalox, pantoprazole, and famotidine.  Was able to tolerate p.o. intake in the emergency department.  He was also advised to follow-up with GI as well as patient likely will benefit from specialized care.  He is comfortable with this plan.  He was given strict return precautions and was discharged in stable condition.      ----      Differential diagnoses considered include but are not limited to: Gastroparesis, viral gastroenteritis,  pancreatitis, cholecystitis, GERD, peptic ulcer disease     Social Determinants of Health which Significantly Impact Care: None identified     EKG Independent Interpretation: EKG not obtained    Independent Result Review and Interpretation: Relevant laboratory and radiographic results were reviewed and independently interpreted by myself.  As necessary, they are commented on in the ED Course.    Chronic conditions affecting the patient's care: As documented above in [facility]    The patient was discussed with the following consultants/services: None    Care Considerations: As documented above in [facility]    ED Course:  Diagnoses as of 12/20/24 2126   Generalized abdominal pain   Gastroesophageal reflux disease without esophagitis     Disposition   As a result of the work-up, the patient was discharged home.  he was informed of his diagnosis and instructed to come back with any concerns or worsening of condition.  he and was agreeable to the plan as discussed above.  he was given the opportunity to ask questions.  All of the patient's questions were answered.    Procedures   Procedures    Patient seen and discussed with ED attending physician.    Doris Rodriguez DO  Emergency Medicine       Doris Rodriguez DO  Resident  12/20/24 2126

## 2024-12-19 NOTE — ED TRIAGE NOTES
Pt c/o nausea and abd pain for 6 weeks. Pt was on ozembic and trulicity. Stopped the ozembic a week and a half ago and the other 3 weeks ago. Was on both for weight loss, not diabetes.

## 2024-12-19 NOTE — DISCHARGE INSTRUCTIONS
Please return to the emergency department, should you have any worsening symptoms, are unable to get an appointment with your primary care physician within the discussed time frame, or have any further concerns.     Please follow up with: GI for further management of GERD and abdominal pain.    You may take ibuprofen or tylenol for pain. You may alternate ibuprofen and tylenol every 6 hours for better pain control.    Do not exceed 2400mg of ibuprofen or 4000mg of tylenol in a 24 hour period.

## 2024-12-24 NOTE — PROGRESS NOTES
I saw and evaluated the patient. I personally obtained the key and critical portions of the history and physical exam or was physically present for key and critical portions performed by the resident/fellow. I reviewed the resident/fellow's documentation and discussed the patient with the resident/fellow. I agree with the resident/fellow's medical decision making as documented in the note.    Rosalinda Martinez MD MPH

## 2024-12-30 ENCOUNTER — NUTRITION (OUTPATIENT)
Dept: SURGERY | Facility: HOSPITAL | Age: 34
End: 2024-12-30
Payer: COMMERCIAL

## 2024-12-30 ENCOUNTER — TELEPHONE (OUTPATIENT)
Dept: PRIMARY CARE | Facility: CLINIC | Age: 34
End: 2024-12-30

## 2024-12-30 DIAGNOSIS — E66.01 CLASS 3 SEVERE OBESITY WITH SERIOUS COMORBIDITY AND BODY MASS INDEX (BMI) OF 60.0 TO 69.9 IN ADULT, UNSPECIFIED OBESITY TYPE: Primary | ICD-10-CM

## 2024-12-30 DIAGNOSIS — Z98.84 BARIATRIC SURGERY STATUS: ICD-10-CM

## 2024-12-30 DIAGNOSIS — E66.813 CLASS 3 SEVERE OBESITY WITH SERIOUS COMORBIDITY AND BODY MASS INDEX (BMI) OF 60.0 TO 69.9 IN ADULT, UNSPECIFIED OBESITY TYPE: Primary | ICD-10-CM

## 2024-12-30 NOTE — PROGRESS NOTES
Bariatric Surgery Program - [facility]    PREOPERATIVE, MULTIDISCIPLINARY, MEDICALLY SUPERVISED, REDUCED CALORIE DIET, BEHAVIOR MODIFICATION AND EXERCISE PROGRAM       S:  IW: 420#    Patient reports he has been ill over the last month, having frequent the ED. Patient states he needs to get back on track with his meal plan and plans to go back to his previous meal plan which included a protein shake in the morning, salad with chicken at lunch and a balanced dinner with lean protein and fiber food. Patient states he made it to the gym today, and hope to be consistent with exercising 5 days/week. Patient reports he had been doing  well with drinking water mainly, some pop while being ill.     Exercise: starting back today going to gym lifting weights and 20 minutes cardio 5 days/week (M-F)    O: Wt: 414 lb   Ht:  66 in   BMI: 67.79    Goal: 5% body weight loss over the course of program    Dietary recommendation:   1. Continue to drink 64 ounces of water and sugar free fluids daily. Avoid alcohol and juice.  2. Continue to eat 3 meals/day, balancing each meal with high protein foods.  3. Continue your exercise routine. Consistency is key!  4. Practice your postop behaviors. No drinking with meals and taking small sips of fluids between meals.   5. Take a multivitamin daily.         A/P: Pt appears to be more motivated to get back on track. Patient is confident to get to the gym 5 day/week and will work on getting back to previous meal plan. Patient will follow up in 1 month.

## 2024-12-31 ENCOUNTER — APPOINTMENT (OUTPATIENT)
Dept: HEMATOLOGY/ONCOLOGY | Facility: HOSPITAL | Age: 34
End: 2024-12-31
Payer: COMMERCIAL

## 2024-12-31 ENCOUNTER — APPOINTMENT (OUTPATIENT)
Dept: RADIOLOGY | Facility: HOSPITAL | Age: 34
End: 2024-12-31
Payer: COMMERCIAL

## 2025-01-02 ENCOUNTER — APPOINTMENT (OUTPATIENT)
Dept: INTEGRATIVE MEDICINE | Facility: CLINIC | Age: 35
End: 2025-01-02
Payer: COMMERCIAL

## 2025-01-21 ENCOUNTER — PATIENT OUTREACH (OUTPATIENT)
Dept: HEMATOLOGY/ONCOLOGY | Facility: HOSPITAL | Age: 35
End: 2025-01-21
Payer: COMMERCIAL

## 2025-01-21 NOTE — PROGRESS NOTES
RN talked to patient. Patient was referred by Dr. Sanabria for priapism. Patient states that his family does not have a history of sickle cell and his Hgb ID in care every where was negative. Patient doesn't know if his family has a history of blood clots. Patient aware of date, time and place. Call back number reviewed. Email sent to patient with directions to find appointment.

## 2025-06-06 ENCOUNTER — HOSPITAL ENCOUNTER (EMERGENCY)
Facility: HOSPITAL | Age: 35
Discharge: HOME | End: 2025-06-06
Attending: STUDENT IN AN ORGANIZED HEALTH CARE EDUCATION/TRAINING PROGRAM
Payer: COMMERCIAL

## 2025-06-06 ENCOUNTER — APPOINTMENT (OUTPATIENT)
Dept: CARDIOLOGY | Facility: HOSPITAL | Age: 35
End: 2025-06-06
Payer: COMMERCIAL

## 2025-06-06 ENCOUNTER — APPOINTMENT (OUTPATIENT)
Dept: RADIOLOGY | Facility: HOSPITAL | Age: 35
End: 2025-06-06
Payer: COMMERCIAL

## 2025-06-06 VITALS
SYSTOLIC BLOOD PRESSURE: 143 MMHG | TEMPERATURE: 97.9 F | WEIGHT: 315 LBS | HEART RATE: 87 BPM | HEIGHT: 66 IN | OXYGEN SATURATION: 97 % | BODY MASS INDEX: 50.62 KG/M2 | RESPIRATION RATE: 18 BRPM | DIASTOLIC BLOOD PRESSURE: 63 MMHG

## 2025-06-06 DIAGNOSIS — R05.2 SUBACUTE COUGH: ICD-10-CM

## 2025-06-06 DIAGNOSIS — R07.9 CHEST PAIN, UNSPECIFIED TYPE: Primary | ICD-10-CM

## 2025-06-06 LAB
ALBUMIN SERPL BCP-MCNC: 3.9 G/DL (ref 3.4–5)
ALP SERPL-CCNC: 43 U/L (ref 33–120)
ALT SERPL W P-5'-P-CCNC: 21 U/L (ref 10–52)
ANION GAP SERPL CALCULATED.3IONS-SCNC: 9 MMOL/L (ref 10–20)
AST SERPL W P-5'-P-CCNC: 21 U/L (ref 9–39)
ATRIAL RATE: 91 BPM
BASOPHILS # BLD AUTO: 0.04 X10*3/UL (ref 0–0.1)
BASOPHILS NFR BLD AUTO: 0.5 %
BILIRUB SERPL-MCNC: 0.5 MG/DL (ref 0–1.2)
BUN SERPL-MCNC: 12 MG/DL (ref 6–23)
CALCIUM SERPL-MCNC: 9.1 MG/DL (ref 8.6–10.3)
CARDIAC TROPONIN I PNL SERPL HS: 3 NG/L (ref 0–20)
CHLORIDE SERPL-SCNC: 104 MMOL/L (ref 98–107)
CO2 SERPL-SCNC: 27 MMOL/L (ref 21–32)
CREAT SERPL-MCNC: 0.9 MG/DL (ref 0.5–1.3)
EGFRCR SERPLBLD CKD-EPI 2021: >90 ML/MIN/1.73M*2
EOSINOPHIL # BLD AUTO: 0.27 X10*3/UL (ref 0–0.7)
EOSINOPHIL NFR BLD AUTO: 3 %
ERYTHROCYTE [DISTWIDTH] IN BLOOD BY AUTOMATED COUNT: 16.7 % (ref 11.5–14.5)
GLUCOSE SERPL-MCNC: 131 MG/DL (ref 74–99)
HCT VFR BLD AUTO: 40.6 % (ref 41–52)
HGB BLD-MCNC: 12.5 G/DL (ref 13.5–17.5)
IMM GRANULOCYTES # BLD AUTO: 0.03 X10*3/UL (ref 0–0.7)
IMM GRANULOCYTES NFR BLD AUTO: 0.3 % (ref 0–0.9)
LYMPHOCYTES # BLD AUTO: 2.66 X10*3/UL (ref 1.2–4.8)
LYMPHOCYTES NFR BLD AUTO: 30 %
MCH RBC QN AUTO: 23.9 PG (ref 26–34)
MCHC RBC AUTO-ENTMCNC: 30.8 G/DL (ref 32–36)
MCV RBC AUTO: 78 FL (ref 80–100)
MONOCYTES # BLD AUTO: 0.38 X10*3/UL (ref 0.1–1)
MONOCYTES NFR BLD AUTO: 4.3 %
NEUTROPHILS # BLD AUTO: 5.48 X10*3/UL (ref 1.2–7.7)
NEUTROPHILS NFR BLD AUTO: 61.9 %
NRBC BLD-RTO: 0 /100 WBCS (ref 0–0)
P AXIS: 58 DEGREES
P OFFSET: 181 MS
P ONSET: 122 MS
PLATELET # BLD AUTO: 360 X10*3/UL (ref 150–450)
POTASSIUM SERPL-SCNC: 3.7 MMOL/L (ref 3.5–5.3)
PR INTERVAL: 184 MS
PROT SERPL-MCNC: 7.4 G/DL (ref 6.4–8.2)
Q ONSET: 214 MS
QRS COUNT: 15 BEATS
QRS DURATION: 84 MS
QT INTERVAL: 378 MS
QTC CALCULATION(BAZETT): 464 MS
QTC FREDERICIA: 434 MS
R AXIS: 46 DEGREES
RBC # BLD AUTO: 5.23 X10*6/UL (ref 4.5–5.9)
SODIUM SERPL-SCNC: 136 MMOL/L (ref 136–145)
T AXIS: 67 DEGREES
T OFFSET: 403 MS
VENTRICULAR RATE: 91 BPM
WBC # BLD AUTO: 8.9 X10*3/UL (ref 4.4–11.3)

## 2025-06-06 PROCEDURE — 36415 COLL VENOUS BLD VENIPUNCTURE: CPT | Performed by: STUDENT IN AN ORGANIZED HEALTH CARE EDUCATION/TRAINING PROGRAM

## 2025-06-06 PROCEDURE — 93005 ELECTROCARDIOGRAM TRACING: CPT

## 2025-06-06 PROCEDURE — 99285 EMERGENCY DEPT VISIT HI MDM: CPT | Performed by: STUDENT IN AN ORGANIZED HEALTH CARE EDUCATION/TRAINING PROGRAM

## 2025-06-06 PROCEDURE — 84484 ASSAY OF TROPONIN QUANT: CPT | Performed by: STUDENT IN AN ORGANIZED HEALTH CARE EDUCATION/TRAINING PROGRAM

## 2025-06-06 PROCEDURE — 71045 X-RAY EXAM CHEST 1 VIEW: CPT

## 2025-06-06 PROCEDURE — 80053 COMPREHEN METABOLIC PANEL: CPT | Performed by: STUDENT IN AN ORGANIZED HEALTH CARE EDUCATION/TRAINING PROGRAM

## 2025-06-06 PROCEDURE — 71045 X-RAY EXAM CHEST 1 VIEW: CPT | Performed by: STUDENT IN AN ORGANIZED HEALTH CARE EDUCATION/TRAINING PROGRAM

## 2025-06-06 PROCEDURE — 85025 COMPLETE CBC W/AUTO DIFF WBC: CPT | Performed by: STUDENT IN AN ORGANIZED HEALTH CARE EDUCATION/TRAINING PROGRAM

## 2025-06-06 RX ORDER — ALBUTEROL SULFATE 90 UG/1
2 INHALANT RESPIRATORY (INHALATION) EVERY 4 HOURS PRN
Qty: 18 G | Refills: 0 | Status: SHIPPED | OUTPATIENT
Start: 2025-06-06 | End: 2025-07-06

## 2025-06-06 ASSESSMENT — LIFESTYLE VARIABLES
EVER FELT BAD OR GUILTY ABOUT YOUR DRINKING: NO
EVER HAD A DRINK FIRST THING IN THE MORNING TO STEADY YOUR NERVES TO GET RID OF A HANGOVER: NO
TOTAL SCORE: 0
HAVE YOU EVER FELT YOU SHOULD CUT DOWN ON YOUR DRINKING: NO
HAVE PEOPLE ANNOYED YOU BY CRITICIZING YOUR DRINKING: NO

## 2025-06-06 ASSESSMENT — PAIN SCALES - GENERAL: PAINLEVEL_OUTOF10: 10 - WORST POSSIBLE PAIN

## 2025-06-06 ASSESSMENT — HEART SCORE
HISTORY: SLIGHTLY SUSPICIOUS
RISK FACTORS: 1-2 RISK FACTORS
AGE: <45
TROPONIN: LESS THAN OR EQUAL TO NORMAL LIMIT
HEART SCORE: 1
ECG: NORMAL

## 2025-06-06 ASSESSMENT — PAIN DESCRIPTION - PAIN TYPE: TYPE: ACUTE PAIN

## 2025-06-06 ASSESSMENT — PAIN - FUNCTIONAL ASSESSMENT: PAIN_FUNCTIONAL_ASSESSMENT: 0-10

## 2025-06-06 ASSESSMENT — PAIN DESCRIPTION - LOCATION: LOCATION: OTHER (COMMENT)

## 2025-06-06 NOTE — ED PROVIDER NOTES
HPI   Chief Complaint   Patient presents with    Flu Symptoms       Patient presents with multiple complaints.  He reports that over the last 3 months, he has been having frequent coughing episodes.  He reports that yesterday, he coughed so much that he vomited.  He has also been having episodes of feeling weak all over.  He reports pain in the chest as well.  He states that he had an episode in which he felt like he was going to die and this prompted his visit.  He was seen 2 days ago and placed on prescription for doxycycline for infection in the right leg.              Patient History   Medical History[1]  Surgical History[2]  Family History[3]  Social History[4]    Physical Exam   ED Triage Vitals [06/06/25 0722]   Temperature Heart Rate Respirations BP   36.6 °C (97.9 °F) 87 18 143/63      Pulse Ox Temp Source Heart Rate Source Patient Position   97 % Temporal Monitor Sitting      BP Location FiO2 (%)     Left arm --       Physical Exam  HENT:      Head: Normocephalic.   Eyes:      General: No scleral icterus.  Cardiovascular:      Rate and Rhythm: Normal rate.   Pulmonary:      Effort: Pulmonary effort is normal.   Musculoskeletal:      Comments: No significant pretibial edema.   Skin:     Comments: Punctate area of whiteness on the right shin.  No significant fluctuance to palpation.  2 small areas of apparent folliculitis on the right side of the abdominal/chest wall.   Neurological:      Mental Status: He is alert.           ED Course & MDM   ED Course as of 06/06/25 0906   Fri Jun 06, 2025 0801 EKG interpreted by me: Normal sinus rhythm, rate 91.  Normal axis.  No significant ST or T wave abnormality. [ML]      ED Course User Index  [ML] Francisco Richardson MD         Diagnoses as of 06/06/25 0906   Chest pain, unspecified type   Subacute cough                 No data recorded                                 Medical Decision Making  Patient felt to be at low risk for major adverse cardiac event in the next 6  weeks by heart score.  PE, aortic dissection, esophageal perforation, pneumothorax were felt to be very unlikely.  Patient was advised to follow-up with primary care physician.  He was given prescription for albuterol inhaler for his cough as there is family history in both his mother as well as his daughters of asthma.  Patient was given return precautions for any worsening symptoms.  Parts of this chart were completed with dictation software, please excuse any errors in transcription.        Procedure  Procedures       [1]   Past Medical History:  Diagnosis Date    Migraine     Priapism, unspecified 03/13/2015    Priapism   [2]   Past Surgical History:  Procedure Laterality Date    OTHER SURGICAL HISTORY  02/11/2020    No history of surgery   [3] No family history on file.  [4]   Social History  Tobacco Use    Smoking status: Never    Smokeless tobacco: Never   Vaping Use    Vaping status: Never Used   Substance Use Topics    Alcohol use: Yes    Drug use: Never        Francisco Richardson MD  06/06/25 0907

## 2025-06-06 NOTE — ED TRIAGE NOTES
Pt states that he has had on and off nausea.vomiting. dizzy spells. States he has pain all over. States he's had a dry cough for about three months. Pt states that he has fibromyalgia and that he has stable angina

## 2025-06-06 NOTE — DISCHARGE INSTRUCTIONS
Your laboratory results, chest x-ray, and EKG did not reveal the cause of your symptoms.  You should follow-up with your primary care physician.  Return to the emergency department with any worsening symptoms.    It is important to remember that your care does not end here and you must continue to monitor your condition closely. Please return to the emergency department for any worsening or concerning signs or symptoms as directed by our conversations and the discharge instructions. If you do not have a doctor please contact the referral number on your discharge instructions. Please contact any physician specialists provided in your discharge notes as it is very important to follow up with them regarding your condition. If you are unable to reach the physicians provided, please come back to the Emergency Department at any time.    Return to emergency room without delay for ANY new or worsening pains or for any other symptoms or concerns.  Return with worsening pains, nausea, vomiting, trouble breathing, palpitations, shortness of breath, inability to pass stool or urine, loss of control of stool or urine, any numbness or tingling (that is not normal for you), uncontrolled fevers, the passing of blood or other material in stool or urine, rashes, pains or for any other symptoms or concerns you may have.  You are always welcome to return to the ER at any time for any reason or for any other concerns you may have.

## 2025-07-14 ENCOUNTER — CLINICAL SUPPORT (OUTPATIENT)
Dept: EMERGENCY MEDICINE | Facility: HOSPITAL | Age: 35
End: 2025-07-14
Payer: COMMERCIAL

## 2025-07-14 ENCOUNTER — APPOINTMENT (OUTPATIENT)
Dept: RADIOLOGY | Facility: HOSPITAL | Age: 35
End: 2025-07-14
Payer: COMMERCIAL

## 2025-07-14 ENCOUNTER — HOSPITAL ENCOUNTER (EMERGENCY)
Facility: HOSPITAL | Age: 35
Discharge: HOME | End: 2025-07-14
Payer: COMMERCIAL

## 2025-07-14 VITALS
OXYGEN SATURATION: 95 % | WEIGHT: 315 LBS | RESPIRATION RATE: 16 BRPM | HEIGHT: 72 IN | SYSTOLIC BLOOD PRESSURE: 143 MMHG | DIASTOLIC BLOOD PRESSURE: 64 MMHG | BODY MASS INDEX: 42.66 KG/M2 | TEMPERATURE: 97.4 F | HEART RATE: 90 BPM

## 2025-07-14 DIAGNOSIS — R07.9 CHEST PAIN, UNSPECIFIED TYPE: Primary | ICD-10-CM

## 2025-07-14 LAB
ALBUMIN SERPL BCP-MCNC: 3.8 G/DL (ref 3.4–5)
ALP SERPL-CCNC: 46 U/L (ref 33–120)
ALT SERPL W P-5'-P-CCNC: 20 U/L (ref 10–52)
ANION GAP SERPL CALC-SCNC: 13 MMOL/L (ref 10–20)
AST SERPL W P-5'-P-CCNC: 24 U/L (ref 9–39)
BASOPHILS # BLD AUTO: 0.07 X10*3/UL (ref 0–0.1)
BASOPHILS NFR BLD AUTO: 0.7 %
BILIRUB SERPL-MCNC: 0.5 MG/DL (ref 0–1.2)
BNP SERPL-MCNC: 2 PG/ML (ref 0–99)
BUN SERPL-MCNC: 12 MG/DL (ref 6–23)
CALCIUM SERPL-MCNC: 9.3 MG/DL (ref 8.6–10.6)
CARDIAC TROPONIN I PNL SERPL HS: <3 NG/L (ref 0–53)
CARDIAC TROPONIN I PNL SERPL HS: <3 NG/L (ref 0–53)
CHLORIDE SERPL-SCNC: 101 MMOL/L (ref 98–107)
CO2 SERPL-SCNC: 29 MMOL/L (ref 21–32)
CREAT SERPL-MCNC: 1.11 MG/DL (ref 0.5–1.3)
D DIMER PPP FEU-MCNC: 412 NG/ML FEU
EGFRCR SERPLBLD CKD-EPI 2021: 88 ML/MIN/1.73M*2
EOSINOPHIL # BLD AUTO: 0.27 X10*3/UL (ref 0–0.7)
EOSINOPHIL NFR BLD AUTO: 2.6 %
ERYTHROCYTE [DISTWIDTH] IN BLOOD BY AUTOMATED COUNT: 15.9 % (ref 11.5–14.5)
GLUCOSE SERPL-MCNC: 87 MG/DL (ref 74–99)
HCT VFR BLD AUTO: 39.8 % (ref 41–52)
HGB BLD-MCNC: 12.3 G/DL (ref 13.5–17.5)
IMM GRANULOCYTES # BLD AUTO: 0.03 X10*3/UL (ref 0–0.7)
IMM GRANULOCYTES NFR BLD AUTO: 0.3 % (ref 0–0.9)
LYMPHOCYTES # BLD AUTO: 3.38 X10*3/UL (ref 1.2–4.8)
LYMPHOCYTES NFR BLD AUTO: 32.2 %
MAGNESIUM SERPL-MCNC: 1.98 MG/DL (ref 1.6–2.4)
MCH RBC QN AUTO: 23.7 PG (ref 26–34)
MCHC RBC AUTO-ENTMCNC: 30.9 G/DL (ref 32–36)
MCV RBC AUTO: 77 FL (ref 80–100)
MONOCYTES # BLD AUTO: 0.83 X10*3/UL (ref 0.1–1)
MONOCYTES NFR BLD AUTO: 7.9 %
NEUTROPHILS # BLD AUTO: 5.93 X10*3/UL (ref 1.2–7.7)
NEUTROPHILS NFR BLD AUTO: 56.3 %
NRBC BLD-RTO: 0 /100 WBCS (ref 0–0)
PLATELET # BLD AUTO: 346 X10*3/UL (ref 150–450)
POTASSIUM SERPL-SCNC: 3.9 MMOL/L (ref 3.5–5.3)
PROT SERPL-MCNC: 8 G/DL (ref 6.4–8.2)
RBC # BLD AUTO: 5.18 X10*6/UL (ref 4.5–5.9)
SODIUM SERPL-SCNC: 139 MMOL/L (ref 136–145)
WBC # BLD AUTO: 10.5 X10*3/UL (ref 4.4–11.3)

## 2025-07-14 PROCEDURE — 71046 X-RAY EXAM CHEST 2 VIEWS: CPT

## 2025-07-14 PROCEDURE — 96374 THER/PROPH/DIAG INJ IV PUSH: CPT

## 2025-07-14 PROCEDURE — 85025 COMPLETE CBC W/AUTO DIFF WBC: CPT | Performed by: NURSE PRACTITIONER

## 2025-07-14 PROCEDURE — 36415 COLL VENOUS BLD VENIPUNCTURE: CPT | Performed by: NURSE PRACTITIONER

## 2025-07-14 PROCEDURE — 93005 ELECTROCARDIOGRAM TRACING: CPT

## 2025-07-14 PROCEDURE — 85379 FIBRIN DEGRADATION QUANT: CPT | Performed by: NURSE PRACTITIONER

## 2025-07-14 PROCEDURE — 2500000004 HC RX 250 GENERAL PHARMACY W/ HCPCS (ALT 636 FOR OP/ED): Mod: JZ,SE | Performed by: NURSE PRACTITIONER

## 2025-07-14 PROCEDURE — 99285 EMERGENCY DEPT VISIT HI MDM: CPT | Mod: 25

## 2025-07-14 PROCEDURE — 84484 ASSAY OF TROPONIN QUANT: CPT | Performed by: NURSE PRACTITIONER

## 2025-07-14 PROCEDURE — 99285 EMERGENCY DEPT VISIT HI MDM: CPT | Performed by: NURSE PRACTITIONER

## 2025-07-14 PROCEDURE — 80053 COMPREHEN METABOLIC PANEL: CPT | Performed by: NURSE PRACTITIONER

## 2025-07-14 PROCEDURE — 83880 ASSAY OF NATRIURETIC PEPTIDE: CPT | Performed by: NURSE PRACTITIONER

## 2025-07-14 PROCEDURE — 71046 X-RAY EXAM CHEST 2 VIEWS: CPT | Performed by: RADIOLOGY

## 2025-07-14 PROCEDURE — 83735 ASSAY OF MAGNESIUM: CPT | Performed by: NURSE PRACTITIONER

## 2025-07-14 RX ORDER — KETOROLAC TROMETHAMINE 15 MG/ML
15 INJECTION, SOLUTION INTRAMUSCULAR; INTRAVENOUS ONCE
Status: COMPLETED | OUTPATIENT
Start: 2025-07-14 | End: 2025-07-14

## 2025-07-14 RX ORDER — IBUPROFEN 600 MG/1
600 TABLET, FILM COATED ORAL EVERY 6 HOURS PRN
Qty: 20 TABLET | Refills: 0 | Status: SHIPPED | OUTPATIENT
Start: 2025-07-14 | End: 2025-07-21

## 2025-07-14 RX ORDER — METHOCARBAMOL 500 MG/1
500 TABLET, FILM COATED ORAL EVERY 6 HOURS PRN
Qty: 30 TABLET | Refills: 0 | Status: SHIPPED | OUTPATIENT
Start: 2025-07-14 | End: 2025-07-24

## 2025-07-14 RX ADMIN — KETOROLAC TROMETHAMINE 15 MG: 15 INJECTION, SOLUTION INTRAMUSCULAR; INTRAVENOUS at 14:23

## 2025-07-14 ASSESSMENT — LIFESTYLE VARIABLES
TOTAL SCORE: 0
HAVE YOU EVER FELT YOU SHOULD CUT DOWN ON YOUR DRINKING: NO
HAVE PEOPLE ANNOYED YOU BY CRITICIZING YOUR DRINKING: NO
EVER FELT BAD OR GUILTY ABOUT YOUR DRINKING: NO
EVER HAD A DRINK FIRST THING IN THE MORNING TO STEADY YOUR NERVES TO GET RID OF A HANGOVER: NO

## 2025-07-14 ASSESSMENT — PAIN - FUNCTIONAL ASSESSMENT: PAIN_FUNCTIONAL_ASSESSMENT: 0-10

## 2025-07-14 NOTE — ED PROVIDER NOTES
HPI   Chief Complaint   Patient presents with    Chest Pain       HPI  This is a 34 year old male with past medical history significant for morbid obesity presents to the ED today with chest pain/pressure since June 30th when he lost a a close family member.   The pain feels like pressure and nothing helps it.       Patient History   Medical History[1]  Surgical History[2]  Family History[3]  Social History[4]    Physical Exam   ED Triage Vitals [07/14/25 1223]   Temperature Heart Rate Respirations BP   36.3 °C (97.4 °F) 90 16 143/64      Pulse Ox Temp src Heart Rate Source Patient Position   95 % -- -- --      BP Location FiO2 (%)     -- --       Physical Exam  Constitutional:       Appearance: He is obese.   HENT:      Head: Normocephalic and atraumatic.      Mouth/Throat:      Mouth: Mucous membranes are moist.   Eyes:      Extraocular Movements: Extraocular movements intact.      Pupils: Pupils are equal, round, and reactive to light.   Cardiovascular:      Rate and Rhythm: Normal rate and regular rhythm.      Pulses: Normal pulses.      Heart sounds: Normal heart sounds.   Pulmonary:      Effort: Pulmonary effort is normal.      Breath sounds: Normal breath sounds.   Abdominal:      Palpations: Abdomen is soft.   Musculoskeletal:         General: Normal range of motion.      Cervical back: Normal range of motion and neck supple.   Skin:     General: Skin is warm and dry.   Neurological:      General: No focal deficit present.      Mental Status: He is alert and oriented to person, place, and time.   Psychiatric:         Mood and Affect: Mood normal.         Behavior: Behavior normal.           ED Course & MDM   Diagnoses as of 07/14/25 1541   Chest pain, unspecified type                 No data recorded     Og Coma Scale Score: 15 (07/14/25 1222 : Leydi Antony RN)                           Medical Decision Making  This is a 34 year old male with past medical history significant for morbid obesity  presents to the ED today with chest pain/pressure since June 30th when he lost a a close family member.   The pain feels like pressure and nothing helps it.     ECG showed HR of 89, VA interval of 144 with no axis deviation and no acute ischemic findings.   His heart score was ZERO and labs were all negative including two negative troponin and a negative dimer. He was given 15 mg of Toradol IV.   I explained all his labs to him as well as his x-ray and ECG and instructed him to follow up with his PCP. He was given prescriptions for Motrin and Robaxin and was subsequently discharged.   Procedure  Procedures       [1]   Past Medical History:  Diagnosis Date    Migraine     Priapism, unspecified 03/13/2015    Priapism   [2]   Past Surgical History:  Procedure Laterality Date    OTHER SURGICAL HISTORY  02/11/2020    No history of surgery   [3] No family history on file.  [4]   Social History  Tobacco Use    Smoking status: Never    Smokeless tobacco: Never   Vaping Use    Vaping status: Never Used   Substance Use Topics    Alcohol use: Yes    Drug use: Never        Alejandra Clancy, JONEL-CNP, [facility]  07/14/25 1554

## 2025-07-15 LAB
ATRIAL RATE: 89 BPM
P AXIS: 64 DEGREES
P OFFSET: 181 MS
P ONSET: 152 MS
PR INTERVAL: 144 MS
Q ONSET: 224 MS
QRS COUNT: 15 BEATS
QRS DURATION: 78 MS
QT INTERVAL: 370 MS
QTC CALCULATION(BAZETT): 450 MS
QTC FREDERICIA: 421 MS
R AXIS: 69 DEGREES
T AXIS: 88 DEGREES
T OFFSET: 409 MS
VENTRICULAR RATE: 89 BPM